# Patient Record
Sex: MALE | Race: NATIVE HAWAIIAN OR OTHER PACIFIC ISLANDER | NOT HISPANIC OR LATINO | Employment: UNEMPLOYED | URBAN - NONMETROPOLITAN AREA
[De-identification: names, ages, dates, MRNs, and addresses within clinical notes are randomized per-mention and may not be internally consistent; named-entity substitution may affect disease eponyms.]

---

## 2024-08-06 ENCOUNTER — OFFICE VISIT (OUTPATIENT)
Dept: FAMILY MEDICINE CLINIC | Facility: CLINIC | Age: 29
End: 2024-08-06
Payer: MEDICAID

## 2024-08-06 VITALS
DIASTOLIC BLOOD PRESSURE: 88 MMHG | SYSTOLIC BLOOD PRESSURE: 118 MMHG | RESPIRATION RATE: 16 BRPM | WEIGHT: 177.9 LBS | OXYGEN SATURATION: 98 % | HEIGHT: 64 IN | HEART RATE: 108 BPM | BODY MASS INDEX: 30.37 KG/M2

## 2024-08-06 DIAGNOSIS — R63.4 WEIGHT LOSS, UNINTENTIONAL: ICD-10-CM

## 2024-08-06 DIAGNOSIS — R19.7 NAUSEA VOMITING AND DIARRHEA: ICD-10-CM

## 2024-08-06 DIAGNOSIS — R19.4 BOWEL HABIT CHANGES: ICD-10-CM

## 2024-08-06 DIAGNOSIS — Z00.00 ANNUAL PHYSICAL EXAM: Primary | ICD-10-CM

## 2024-08-06 DIAGNOSIS — R11.2 NAUSEA VOMITING AND DIARRHEA: ICD-10-CM

## 2024-08-06 PROCEDURE — 1160F RVW MEDS BY RX/DR IN RCRD: CPT

## 2024-08-06 PROCEDURE — 2014F MENTAL STATUS ASSESS: CPT

## 2024-08-06 PROCEDURE — 99385 PREV VISIT NEW AGE 18-39: CPT

## 2024-08-06 PROCEDURE — 1159F MED LIST DOCD IN RCRD: CPT

## 2024-08-06 PROCEDURE — 1126F AMNT PAIN NOTED NONE PRSNT: CPT

## 2024-08-06 RX ORDER — POLYETHYLENE GLYCOL 3350 17 G/17G
17 POWDER, FOR SOLUTION ORAL DAILY
COMMUNITY

## 2024-08-06 RX ORDER — PANTOPRAZOLE SODIUM 20 MG/1
20 TABLET, DELAYED RELEASE ORAL DAILY
Qty: 30 TABLET | Refills: 1 | Status: SHIPPED | OUTPATIENT
Start: 2024-08-06

## 2024-08-06 NOTE — PROGRESS NOTES
New Patient Office Visit      Date: 24   Patient Name: Mick Roa  : 1995   MRN: [id number]     Chief Complaint   Patient presents with    Hospital Follow Up Visit     Patient to ER on 2024 where he was diagnoses with constipation.     Bloated     Patient states that he had food poison back in 2019. He states that since then he has not being able to digest red meat, spicy foods, and greasy food. Patient these past couple of month he has had some abdominal bloating, abdominal pressure, has had some cramping, constipation, and states that he has had mucus come out in his stool and when wiping. Patient states he has used miralax and now has had diarrhea the past 5 weeks.      History of Present Illness: Mick Roa is a 28 y.o. male who is here today for a get acquainted visit to establish care with a new provider. I have reviewed the patient's medical history and updated the computerized patient record. Baseline screening tests were discussed today and pt agrees to discuss health maintenance and goals in future appointments.    HPI Notes:   - thought he had food poisoning, headache, chills, body aches  Several weeks, upset stomach and diarrhea  Blood in stool x 5 occasions - then went to the ER  Inguinal bulge  History of external hemorrhoids (self diagnosis)    Subjective     History reviewed. No pertinent past medical history.    Past Surgical History:   Procedure Laterality Date    HIP FRACTURE SURGERY Left        Family History   Problem Relation Age of Onset    Brain cancer Paternal Grandfather     Diabetes Paternal Grandfather        Social History     Socioeconomic History    Marital status: Single   Tobacco Use    Smoking status: Never    Smokeless tobacco: Never   Vaping Use    Vaping status: Never Used   Substance and Sexual Activity    Alcohol use: Never    Drug use: Never    Sexual activity: Not Currently       Current Outpatient Medications:     polyethylene  "glycol (MiraLax) 17 g packet, Take 17 g by mouth Daily., Disp: , Rfl:     pantoprazole (Protonix) 20 MG EC tablet, Take 1 tablet by mouth Daily., Disp: 30 tablet, Rfl: 1    No Known Allergies    Objective     Vitals:    08/06/24 1318   BP: 118/88   BP Location: Right arm   Patient Position: Sitting   Cuff Size: Large Adult   Pulse: 108   Resp: 16   SpO2: 98%   Weight: 80.7 kg (177 lb 14.4 oz)   Height: 162.6 cm (64\")   PainSc: 0-No pain     Physical Exam  Vitals and nursing note reviewed.   Constitutional:       General: He is not in acute distress.     Appearance: Normal appearance. He is not toxic-appearing.   HENT:      Head: Normocephalic and atraumatic.      Right Ear: Tympanic membrane, ear canal and external ear normal.      Left Ear: Tympanic membrane, ear canal and external ear normal.   Eyes:      Pupils: Pupils are equal, round, and reactive to light.   Cardiovascular:      Rate and Rhythm: Regular rhythm. Tachycardia present.      Pulses: Normal pulses.      Heart sounds: Normal heart sounds. No murmur heard.     No gallop.   Pulmonary:      Effort: Pulmonary effort is normal. No respiratory distress.      Breath sounds: Normal breath sounds.   Abdominal:      General: Abdomen is protuberant. Bowel sounds are increased.      Palpations: Abdomen is soft. There is no hepatomegaly or mass.      Tenderness: There is no abdominal tenderness. There is no guarding. Negative signs include Mohan's sign and McBurney's sign.   Musculoskeletal:         General: Normal range of motion.      Cervical back: Normal range of motion.      Right lower leg: No edema.      Left lower leg: No edema.   Skin:     General: Skin is warm and dry.   Neurological:      General: No focal deficit present.      Mental Status: He is alert and oriented to person, place, and time.      Motor: No weakness.      Coordination: Coordination normal.   Psychiatric:         Mood and Affect: Mood normal.         Behavior: Behavior normal. "     PHQ-9 Total Score: 0     Assessment / Plan      Diagnoses and all orders for this visit:    1. Annual physical exam (Primary)  Assessment & Plan:  New patient presents today for annual wellness exam with chief complaint of recent ER visit related to changes in bowel habits.  Available medical records reviewed today with the patient and appropriate diagnostic testing ordered.  Patient education materials attached to AVS related to health maintenance recommendations and healthy lifestyle. Materials were reviewed with patient during his office visit today and all questions addressed.  Patient agrees to follow-up in 2 weeks to discuss diagnostic testing, address health goals,  and address care gaps.    Orders:  -     CBC Auto Differential  -     Comprehensive Metabolic Panel  -     Hemoglobin A1c  -     Lipid Panel  -     TSH Rfx On Abnormal To Free T4  -     Hepatitis C antibody; Future  -     Hepatitis C antibody    2. Bowel habit changes  Assessment & Plan:  Patient's symptoms started in June when he thought he had food poisoning.  Symptoms were headache, chills, body aches as well as diarrhea.  Patient reports dark red blood in his stool x 5 occasions which prompted him to go to the ER where he was evaluated and released.  He has had no follow-up since that time.  Patient reports self diagnosis of external hemorrhoids and reports he often strains to defecate even when he has diarrhea.  Patient education provided regarding healthy bowel habits including adequate water intake, dietary fiber, and avoiding overuse of stool softeners and laxatives.  Information attached to patient's after visit summary regarding bowel cleanse protocol should he become constipated for several days.  Protocol also discusses how to titrate back on MiraLAX after desired frequency and consistency of bowel movements is reached.  Patient reports bowel movements 3-4 times per day.    Reviewed [place] stool scale with patient during his  office visit today and discussed goal being 1 bowel movement per day type IV on [place] stool scale.  Patient also take probiotic and pantoprazole.  We will complete H. pylori testing and baseline lab testing today.  Patient agrees to follow-up in 2 weeks unless otherwise indicated. We will re-evaluate at that time.    Orders:  -     CBC Auto Differential    3. Weight loss, unintentional  Assessment & Plan:  Patient reports an unintentional weight loss of approximately 15 pounds over the past 7 weeks.  Baseline diagnostic testing collected today and [place] [facility] [place]    Orders:  -     Hemoglobin A1c  -     H. Pylori Breath Test - Breath, Lung; Future    4. Nausea vomiting and diarrhea  -     pantoprazole (Protonix) 20 MG EC tablet; Take 1 tablet by mouth Daily.  Dispense: 30 tablet; Refill: 1    Mention concern about intermittent pain in his right scrotal area and he is concerned he may have intermittent hernia.  Patient agrees to monitor himself and discuss at routine follow-up visit in 2 weeks unless otherwise indicated.      Body mass index is 30.54 kg/m².  BMI is >= 30 and <35. (Class 1 Obesity). The following options were offered after discussion;: weight loss educational material (shared in after visit summary) and information on healthy weight added to patient's after visit summary     Patient Education:   Reviewed medications, potential side effects and signs and symptoms to report.   Discussed risk versus benefits of treatment plan with patient and/or family-including medications, labs and radiology that may be ordered.    Patient education materials attached to AVS related to Health maintenance and bowel cleanse and these materials reviewed with patient during office visit today.   Addressed questions and concerns during visit. Patient and/or family verbalized understanding and agree with plan.   Instructed to call the office with any questions and report to  ER with any life-threatening symptoms.     Return in about 2 weeks (around 8/20/2024) for Recheck.    CHOLO Abbott (Libby) [facility]  [address] [place] [address] [id number] [phone]    NOTE TO PATIENT: The 21st Century Cures Act makes medical notes like these available to patients in the interest of transparency. However, be advised this is a medical document. It is intended as peer to peer communication. It is written in medical language and may contain abbreviations or verbiage that are unfamiliar. It may appear blunt or direct. Medical documents are intended to carry relevant information, facts as evident, and the clinical opinion of the practitioner.      EMR Dragon/Transcription disclaimer:  Much of this encounter note is an electronic transcription of spoken language to printed text. Electronic transcription of spoken language may permit erroneous, or at times, nonsensical words or phrases to be inadvertently transcribed. Although I have reviewed the note for such errors, some may still exist.

## 2024-08-06 NOTE — PATIENT INSTRUCTIONS

## 2024-08-07 LAB
ALBUMIN SERPL-MCNC: 5 G/DL (ref 4.3–5.2)
ALP SERPL-CCNC: 79 IU/L (ref 44–121)
ALT SERPL-CCNC: 54 IU/L (ref 0–44)
AST SERPL-CCNC: 21 IU/L (ref 0–40)
BASOPHILS # BLD AUTO: 0 X10E3/UL (ref 0–0.2)
BASOPHILS NFR BLD AUTO: 0 %
BILIRUB SERPL-MCNC: 0.9 MG/DL (ref 0–1.2)
BUN SERPL-MCNC: 10 MG/DL (ref 6–20)
BUN/CREAT SERPL: 9 (ref 9–20)
CALCIUM SERPL-MCNC: 9.9 MG/DL (ref 8.7–10.2)
CHLORIDE SERPL-SCNC: 102 MMOL/L (ref 96–106)
CHOLEST SERPL-MCNC: 195 MG/DL (ref 100–199)
CO2 SERPL-SCNC: 24 MMOL/L (ref 20–29)
CREAT SERPL-MCNC: 1.06 MG/DL (ref 0.76–1.27)
EGFRCR SERPLBLD CKD-EPI 2021: 97 ML/MIN/1.73
EOSINOPHIL # BLD AUTO: 0.1 X10E3/UL (ref 0–0.4)
EOSINOPHIL NFR BLD AUTO: 1 %
ERYTHROCYTE [DISTWIDTH] IN BLOOD BY AUTOMATED COUNT: 13.4 % (ref 11.6–15.4)
GLOBULIN SER CALC-MCNC: 2.7 G/DL (ref 1.5–4.5)
GLUCOSE SERPL-MCNC: 112 MG/DL (ref 70–99)
HBA1C MFR BLD: 5.1 % (ref 4.8–5.6)
HCT VFR BLD AUTO: 50.6 % (ref 37.5–51)
HCV IGG SERPL QL IA: NON REACTIVE
HDLC SERPL-MCNC: 44 MG/DL
HGB BLD-MCNC: 17.1 G/DL (ref 13–17.7)
IMM GRANULOCYTES # BLD AUTO: 0 X10E3/UL (ref 0–0.1)
IMM GRANULOCYTES NFR BLD AUTO: 0 %
LDLC SERPL CALC-MCNC: 132 MG/DL (ref 0–99)
LYMPHOCYTES # BLD AUTO: 1.8 X10E3/UL (ref 0.7–3.1)
LYMPHOCYTES NFR BLD AUTO: 26 %
MCH RBC QN AUTO: 31.5 PG (ref 26.6–33)
MCHC RBC AUTO-ENTMCNC: 33.8 G/DL (ref 31.5–35.7)
MCV RBC AUTO: 93 FL (ref 79–97)
MONOCYTES # BLD AUTO: 0.5 X10E3/UL (ref 0.1–0.9)
MONOCYTES NFR BLD AUTO: 7 %
NEUTROPHILS # BLD AUTO: 4.4 X10E3/UL (ref 1.4–7)
NEUTROPHILS NFR BLD AUTO: 66 %
PLATELET # BLD AUTO: 202 X10E3/UL (ref 150–450)
POTASSIUM SERPL-SCNC: 4 MMOL/L (ref 3.5–5.2)
PROT SERPL-MCNC: 7.7 G/DL (ref 6–8.5)
RBC # BLD AUTO: 5.42 X10E6/UL (ref 4.14–5.8)
SODIUM SERPL-SCNC: 141 MMOL/L (ref 134–144)
TRIGL SERPL-MCNC: 105 MG/DL (ref 0–149)
TSH SERPL DL<=0.005 MIU/L-ACNC: 1.45 UIU/ML (ref 0.45–4.5)
VLDLC SERPL CALC-MCNC: 19 MG/DL (ref 5–40)
WBC # BLD AUTO: 6.7 X10E3/UL (ref 3.4–10.8)

## 2024-08-07 NOTE — ASSESSMENT & PLAN NOTE
Patient reports an unintentional weight loss of approximately 15 pounds over the past 7 weeks.  Baseline diagnostic testing collected today and [place] [facility] [place]

## 2024-08-07 NOTE — ASSESSMENT & PLAN NOTE
Patient's symptoms started in June when he thought he had food poisoning.  Symptoms were headache, chills, body aches as well as diarrhea.  Patient reports dark red blood in his stool x 5 occasions which prompted him to go to the ER where he was evaluated and released.  He has had no follow-up since that time.  Patient reports self diagnosis of external hemorrhoids and reports he often strains to defecate even when he has diarrhea.  Patient education provided regarding healthy bowel habits including adequate water intake, dietary fiber, and avoiding overuse of stool softeners and laxatives.  Information attached to patient's after visit summary regarding bowel cleanse protocol should he become constipated for several days.  Protocol also discusses how to titrate back on MiraLAX after desired frequency and consistency of bowel movements is reached.  Patient reports bowel movements 3-4 times per day.    Reviewed [place] stool scale with patient during his office visit today and discussed goal being 1 bowel movement per day type IV on [place] stool scale.  Patient also take probiotic and pantoprazole.  We will complete H. pylori testing and baseline lab testing today.  Patient agrees to follow-up in 2 weeks unless otherwise indicated. We will re-evaluate at that time.

## 2024-08-07 NOTE — ASSESSMENT & PLAN NOTE
New patient presents today for annual wellness exam with chief complaint of recent ER visit related to changes in bowel habits.  Available medical records reviewed today with the patient and appropriate diagnostic testing ordered.  Patient education materials attached to AVS related to health maintenance recommendations and healthy lifestyle. Materials were reviewed with patient during his office visit today and all questions addressed.  Patient agrees to follow-up in 2 weeks to discuss diagnostic testing, address health goals,  and address care gaps.

## 2024-08-20 ENCOUNTER — OFFICE VISIT (OUTPATIENT)
Dept: FAMILY MEDICINE CLINIC | Facility: CLINIC | Age: 29
End: 2024-08-20
Payer: COMMERCIAL

## 2024-08-20 VITALS
SYSTOLIC BLOOD PRESSURE: 120 MMHG | RESPIRATION RATE: 18 BRPM | DIASTOLIC BLOOD PRESSURE: 82 MMHG | OXYGEN SATURATION: 98 % | BODY MASS INDEX: 30.54 KG/M2 | WEIGHT: 178.9 LBS | HEIGHT: 64 IN | HEART RATE: 78 BPM

## 2024-08-20 DIAGNOSIS — Z23 NEED FOR VACCINATION: ICD-10-CM

## 2024-08-20 DIAGNOSIS — R63.4 WEIGHT LOSS, UNINTENTIONAL: ICD-10-CM

## 2024-08-20 DIAGNOSIS — R10.31 UNILATERAL GROIN PAIN, RIGHT: ICD-10-CM

## 2024-08-20 DIAGNOSIS — R19.4 BOWEL HABIT CHANGES: Primary | ICD-10-CM

## 2024-08-20 DIAGNOSIS — K21.9 GASTROESOPHAGEAL REFLUX DISEASE WITHOUT ESOPHAGITIS: ICD-10-CM

## 2024-08-20 PROBLEM — K59.01 SLOW TRANSIT CONSTIPATION: Status: ACTIVE | Noted: 2024-07-06

## 2024-08-20 PROCEDURE — 1160F RVW MEDS BY RX/DR IN RCRD: CPT

## 2024-08-20 PROCEDURE — 99214 OFFICE O/P EST MOD 30 MIN: CPT

## 2024-08-20 PROCEDURE — 1126F AMNT PAIN NOTED NONE PRSNT: CPT

## 2024-08-20 PROCEDURE — 1159F MED LIST DOCD IN RCRD: CPT

## 2024-08-20 RX ORDER — PANTOPRAZOLE SODIUM 40 MG/1
40 TABLET, DELAYED RELEASE ORAL DAILY
Qty: 30 TABLET | Refills: 3 | Status: SHIPPED | OUTPATIENT
Start: 2024-08-20

## 2024-08-20 NOTE — ASSESSMENT & PLAN NOTE
"Currently taking capful of MiraLAX every other day  Reports continued concerns with frequent belching, \"stomach gurgling\", and frequent bowel movements  Bowel movements 3-4 times per day-semi formed  Recommended he continue to decrease or stop MiraLAX until he achieves 1 BM per day-type IV on [place] stool scale  Patient reports diet consisting of Asian food including mushrooms, onions, pork, carrots, peppers as well as other intake.  When we discuss that these foods are known to produce more gas, patient resistant to this as a cause citing previous consumption of these foods did not cause issue   H. pylori breath test negative  Patient would like to have referral to GI to evaluate his concerns  See orders  "

## 2024-08-20 NOTE — ASSESSMENT & PLAN NOTE
Continued symptoms of GERD  Medication changes per orders  Patient to follow-up at next routine wellness visit unless otherwise indicated

## 2024-08-20 NOTE — ASSESSMENT & PLAN NOTE
"Patient is concerned when he walks he feels \"a bulge\" in his right groin  Unable to palpate inguinal hernia on exam  Patient agrees to ultrasound of the groin to rule out presence of hernia  No change in treatment regimen at this time pending imaging results  "

## 2024-08-20 NOTE — PATIENT INSTRUCTIONS

## 2024-08-20 NOTE — PROGRESS NOTES
"     Follow Up Office Visit      Date:  2024   Patient Name: Mick Roa  : 1995   MRN: [id number]       Chief Complaint   Patient presents with    Results     Follow up on labs.        History of Present Illness: Mick Roa is a 28 y.o. male who is here today for follow up on labs.  Patient continues to have concerns about bilateral pain in his scrotum intermittently and is concerned about frequent belching and \"stomach gurgling\".  Patient denies any changes in diet but has decreased his activity levels out of concern for right inguinal hernia possibility.  Patient reports when he walks strenuously he feels \"a bulge\" in his right inguinal area.  Patient denies any new illness or injury since last visit. Denies falls, unexplained weight change, fevers, chills, or other constitutional symptoms and has no additional questions or concens at this time.    Bilateral scrotal pain - sharp pain intermittently  Concerned about \"stomach gurgling\"   Frequent belching      Subjective      History reviewed. No pertinent past medical history.    Past Surgical History:   Procedure Laterality Date    HIP FRACTURE SURGERY Left        Family History   Problem Relation Age of Onset    Brain cancer Paternal Grandfather     Diabetes Paternal Grandfather        Social History     Socioeconomic History    Marital status: Single   Tobacco Use    Smoking status: Never    Smokeless tobacco: Never   Vaping Use    Vaping status: Never Used   Substance and Sexual Activity    Alcohol use: Never    Drug use: Never    Sexual activity: Not Currently       Current Outpatient Medications:     polyethylene glycol (MiraLax) 17 g packet, Take 17 g by mouth Daily., Disp: , Rfl:     pantoprazole (Protonix) 40 MG EC tablet, Take 1 tablet by mouth Daily., Disp: 30 tablet, Rfl: 3    No Known Allergies    Objective     Physical Exam  Vitals and nursing note reviewed.   Constitutional:       General: He is not in acute distress.     " "Appearance: Normal appearance. He is not toxic-appearing.   HENT:      Head: Normocephalic.   Eyes:      Pupils: Pupils are equal, round, and reactive to light.   Cardiovascular:      Rate and Rhythm: Normal rate and regular rhythm.      Heart sounds: No murmur heard.  Pulmonary:      Effort: Pulmonary effort is normal. No respiratory distress.      Breath sounds: Normal breath sounds.   Abdominal:      General: Abdomen is flat. Bowel sounds are increased.      Tenderness: There is no abdominal tenderness.      Hernia: There is no hernia in the left inguinal area or right inguinal area.   Musculoskeletal:         General: Normal range of motion.      Cervical back: Normal range of motion and neck supple.      Right lower leg: No edema.      Left lower leg: No edema.   Skin:     General: Skin is warm and dry.   Neurological:      Mental Status: He is alert and oriented to person, place, and time.   Psychiatric:         Mood and Affect: Mood normal.         Behavior: Behavior normal.       Vitals:    08/20/24 1306   BP: 120/82   BP Location: Right arm   Patient Position: Sitting   Cuff Size: Large Adult   Pulse: 78   Resp: 18   SpO2: 98%   Weight: 81.1 kg (178 lb 14.4 oz)   Height: 162.6 cm (64.02\")   PainSc: 0-No pain     Body mass index is 30.69 kg/m².          The ASCVD Risk score (Rhiannon DK, et al., 2019) failed to calculate for the following reasons:    The 2019 ASCVD risk score is only valid for ages 40 to 79      Assessment / Plan      Diagnoses and all orders for this visit:    1. Bowel habit changes (Primary)  Assessment & Plan:  Currently taking capful of MiraLAX every other day  Reports continued concerns with frequent belching, \"stomach gurgling\", and frequent bowel movements  Bowel movements 3-4 times per day-semi formed  Recommended he continue to decrease or stop MiraLAX until he achieves 1 BM per day-type IV on [place] stool scale  Patient reports diet consisting of Asian food including mushrooms, " "onions, pork, carrots, peppers as well as other intake.  When we discuss that these foods are known to produce more gas, patient resistant to this as a cause citing previous consumption of these foods did not cause issue   H. pylori breath test negative  Patient would like to have referral to GI to evaluate his concerns  See orders    Orders:  -     Ambulatory Referral to Gastroenterology    2. Weight loss, unintentional  Assessment & Plan:  Weight appears stable at this time  BMI 30.69 kg/m²  Most recent lab work normal      3. Gastroesophageal reflux disease without esophagitis  Assessment & Plan:  Continued symptoms of GERD  Medication changes per orders  Patient to follow-up at next routine wellness visit unless otherwise indicated    Orders:  -     pantoprazole (Protonix) 40 MG EC tablet; Take 1 tablet by mouth Daily.  Dispense: 30 tablet; Refill: 3    4. Unilateral groin pain, right  Assessment & Plan:  Patient is concerned when he walks he feels \"a bulge\" in his right groin  Unable to palpate inguinal hernia on exam  Patient agrees to ultrasound of the groin to rule out presence of hernia  No change in treatment regimen at this time pending imaging results    Orders:  -     US Nonvascular Extremity Limited; Future     Patient Education:   Reviewed medications, potential side effects and signs and symptoms to report.   Discussed risk versus benefits of treatment plan with patient and/or family-including medications, labs and radiology that may be ordered.    Patient education materials attached to AVS related to routine health maintenance and a list of known excess gas producing foods.  These materials reviewed with patient during office visit today.   Addressed questions and concerns during visit. Patient and/or family verbalized understanding and agree with plan.   Instructed to call the office with any questions and report to ER with any life-threatening symptoms.     Return in about 6 months (around " 2/20/2025) for Follow-up.    CHOLO Abbott (Libby) [facility]  [address] [facility] [id number] [phone]    NOTE TO PATIENT: The 21st Century Cures Act makes medical notes like these available to patients in the interest of transparency. However, be advised this is a medical document. It is intended as peer to peer communication. It is written in medical language and may contain abbreviations or verbiage that are unfamiliar. It may appear blunt or direct. Medical documents are intended to carry relevant information, facts as evident, and the clinical opinion of the practitioner.     EMR Dragon/Transcription disclaimer:  Much of this encounter note is an electronic transcription of spoken language to printed text. Electronic transcription of spoken language may permit erroneous, or at times, nonsensical words or phrases to be inadvertently transcribed. Although I have reviewed the note for such errors, some may still exist.

## 2024-09-03 ENCOUNTER — HOSPITAL ENCOUNTER (OUTPATIENT)
Dept: ULTRASOUND IMAGING | Facility: HOSPITAL | Age: 29
Discharge: HOME | End: 2024-09-03
Payer: COMMERCIAL

## 2024-09-03 DIAGNOSIS — R10.31 UNILATERAL GROIN PAIN, RIGHT: ICD-10-CM

## 2024-09-03 PROCEDURE — 76882 US LMTD JT/FCL EVL NVASC XTR: CPT

## 2024-09-12 ENCOUNTER — TELEPHONE (OUTPATIENT)
Dept: FAMILY MEDICINE CLINIC | Facility: CLINIC | Age: 29
End: 2024-09-12
Payer: COMMERCIAL

## 2024-09-12 NOTE — TELEPHONE ENCOUNTER
Caller: Mick Roa Jr.    Relationship: Self    Best call back number: [phone]     What was the call regarding: PATIENT SAID HE GOT RESULTS BACK FROM ULTRASOUND SHOWING IN MYCHART AND THERE APPEARS TO BE A HERNIA FOUND. WENT ON AND SCHEDULED FIRST APPOINTMENT WITH PCP WE HAD BUT WANTED TO SEE WHAT SHOULD BE DONE OR IF HE NEEDS TO COME IN SOONER. PLEASE REVIEW FINDINGS AND CALL TO DISCUSS WITH PATIENT IF NEEDED SOONER APPOINTMENT

## 2024-09-13 NOTE — TELEPHONE ENCOUNTER
Called patient and advised. Patient voiced understanding and is ok with keeping his appointment on the 30th.

## 2024-09-19 ENCOUNTER — LAB (OUTPATIENT)
Dept: LAB | Facility: HOSPITAL | Age: 29
End: 2024-09-19
Payer: COMMERCIAL

## 2024-09-19 ENCOUNTER — OFFICE VISIT (OUTPATIENT)
Dept: GASTROENTEROLOGY | Facility: CLINIC | Age: 29
End: 2024-09-19
Payer: COMMERCIAL

## 2024-09-19 VITALS
TEMPERATURE: 98 F | HEART RATE: 92 BPM | BODY MASS INDEX: 31.04 KG/M2 | DIASTOLIC BLOOD PRESSURE: 41 MMHG | HEIGHT: 64 IN | SYSTOLIC BLOOD PRESSURE: 143 MMHG | WEIGHT: 181.8 LBS

## 2024-09-19 DIAGNOSIS — R19.7 DIARRHEA, UNSPECIFIED TYPE: ICD-10-CM

## 2024-09-19 DIAGNOSIS — K92.1 HEMATOCHEZIA: Primary | ICD-10-CM

## 2024-09-19 PROCEDURE — 86364 TISS TRNSGLTMNASE EA IG CLAS: CPT

## 2024-09-19 PROCEDURE — 36415 COLL VENOUS BLD VENIPUNCTURE: CPT

## 2024-09-19 PROCEDURE — 86258 DGP ANTIBODY EACH IG CLASS: CPT

## 2024-09-19 PROCEDURE — 86140 C-REACTIVE PROTEIN: CPT

## 2024-09-19 PROCEDURE — 85652 RBC SED RATE AUTOMATED: CPT

## 2024-09-19 PROCEDURE — 82784 ASSAY IGA/IGD/IGG/IGM EACH: CPT

## 2024-09-19 PROCEDURE — 86231 EMA EACH IG CLASS: CPT

## 2024-09-19 RX ORDER — MONTELUKAST SODIUM 4 MG/1
1 TABLET, CHEWABLE ORAL 2 TIMES DAILY
Qty: 60 TABLET | Refills: 11 | Status: SHIPPED | OUTPATIENT
Start: 2024-09-19

## 2024-09-20 LAB
CRP SERPL-MCNC: <0.3 MG/DL (ref 0–0.5)
ENDOMYSIUM IGA SER QL: NEGATIVE
ERYTHROCYTE [SEDIMENTATION RATE] IN BLOOD: 5 MM/HR (ref 0–15)
GLIADIN PEPTIDE IGA SER-ACNC: 4 UNITS (ref 0–19)
GLIADIN PEPTIDE IGG SER-ACNC: 2 UNITS (ref 0–19)
IGA SERPL-MCNC: 271 MG/DL (ref 90–386)
TTG IGA SER-ACNC: <2 U/ML (ref 0–3)
TTG IGG SER-ACNC: <2 U/ML (ref 0–5)

## 2024-09-23 RX ORDER — PEG-3350, SODIUM SULFATE, SODIUM CHLORIDE, POTASSIUM CHLORIDE, SODIUM ASCORBATE AND ASCORBIC ACID 7.5-2.691G
KIT ORAL
Qty: 1 EACH | Refills: 0 | Status: SHIPPED | OUTPATIENT
Start: 2024-09-23

## 2024-09-24 ENCOUNTER — LAB (OUTPATIENT)
Dept: LAB | Facility: HOSPITAL | Age: 29
End: 2024-09-24
Payer: COMMERCIAL

## 2024-09-24 DIAGNOSIS — R19.7 DIARRHEA, UNSPECIFIED TYPE: ICD-10-CM

## 2024-09-24 LAB

## 2024-09-24 PROCEDURE — 83993 ASSAY FOR CALPROTECTIN FECAL: CPT

## 2024-09-24 PROCEDURE — 82653 EL-1 FECAL QUANTITATIVE: CPT

## 2024-09-24 PROCEDURE — 87507 IADNA-DNA/RNA PROBE TQ 12-25: CPT

## 2024-09-28 LAB
CALPROTECTIN STL-MCNT: 100 UG/G (ref 0–120)
ELASTASE PANC STL-MCNT: >800 UG ELAST./G

## 2024-09-30 ENCOUNTER — TELEPHONE (OUTPATIENT)
Dept: FAMILY MEDICINE CLINIC | Facility: CLINIC | Age: 29
End: 2024-09-30

## 2024-09-30 ENCOUNTER — OFFICE VISIT (OUTPATIENT)
Dept: FAMILY MEDICINE CLINIC | Facility: CLINIC | Age: 29
End: 2024-09-30
Payer: COMMERCIAL

## 2024-09-30 VITALS
OXYGEN SATURATION: 98 % | RESPIRATION RATE: 16 BRPM | HEART RATE: 80 BPM | SYSTOLIC BLOOD PRESSURE: 130 MMHG | WEIGHT: 183.9 LBS | HEIGHT: 64 IN | BODY MASS INDEX: 31.4 KG/M2 | DIASTOLIC BLOOD PRESSURE: 80 MMHG

## 2024-09-30 DIAGNOSIS — Z23 NEED FOR VACCINATION: ICD-10-CM

## 2024-09-30 DIAGNOSIS — R10.31 UNILATERAL GROIN PAIN, RIGHT: Primary | ICD-10-CM

## 2024-09-30 DIAGNOSIS — K40.91 UNILATERAL RECURRENT INGUINAL HERNIA WITHOUT OBSTRUCTION OR GANGRENE: ICD-10-CM

## 2024-09-30 DIAGNOSIS — R19.4 BOWEL HABIT CHANGES: ICD-10-CM

## 2024-09-30 DIAGNOSIS — M79.641 RIGHT HAND PAIN: ICD-10-CM

## 2024-09-30 NOTE — ASSESSMENT & PLAN NOTE
Recent ultrasound shows right inguinal hernia without incarcerated bowel  Patient concerned this will become worse if he goes back to normal workout routine.  Patient agrees to referral to general surgery to evaluate for possible intervention  No change in treatment regimen at this time, patient to follow-up as needed

## 2024-09-30 NOTE — PROGRESS NOTES
Follow Up Office Visit      Date:  2024   Patient Name: Mick Roa Jr.  : 1995   MRN: [id number]     Chief Complaint   Patient presents with   • Results     Follow up on ultrasound       History of Present Illness: Mick Roa Jr. is a 28 y.o. male who is here today for follow up on ultrasound.  Abdominal ultrasound confirmed presence of inguinal hernia.  Patient has minimal symptoms per his report but is concerned if he goes back to normal workout routine this will continue to get worse.  Otherwise, patient denies any new illness or injury since last visit. Denies falls, unexplained weight change, fevers, chills, or other constitutional symptoms and has no additional questions or concens at this time.    Going for colonoscopy this week   Referral to gen/surg for inguinal hernia eval  Concerned about right knuckle - punched wood and continued issue with knuckle   Pain in left hand - knuckle popping     Subjective      History reviewed. No pertinent past medical history.    Past Surgical History:   Procedure Laterality Date   • HIP FRACTURE SURGERY Left        Family History   Problem Relation Age of Onset   • Brain cancer Paternal Grandfather    • Diabetes Paternal Grandfather    • Colon cancer Neg Hx        Social History     Socioeconomic History   • Marital status: Single   Tobacco Use   • Smoking status: Never   • Smokeless tobacco: Never   Vaping Use   • Vaping status: Never Used   Substance and Sexual Activity   • Alcohol use: Never   • Drug use: Never   • Sexual activity: Not Currently         Current Outpatient Medications:   •  colestipol (COLESTID) 1 g tablet, Take 1 tablet by mouth 2 (Two) Times a Day. (Patient not taking: Reported on 2024), Disp: 60 tablet, Rfl: 11  •  pantoprazole (Protonix) 40 MG EC tablet, Take 1 tablet by mouth Daily. (Patient not taking: Reported on 2024), Disp: 30 tablet, Rfl: 3  •  PEG-KCl-NaCl-NaSulf-Na Asc-C (MoviPrep)  "100 g reconstituted solution powder, Use as directed by provider for colonoscopy prep (Patient not taking: Reported on 9/30/2024), Disp: 1 each, Rfl: 0  •  polyethylene glycol (MiraLax) 17 g packet, Take 17 g by mouth Daily. (Patient not taking: Reported on 9/19/2024), Disp: , Rfl:     No Known Allergies    Objective     Physical Exam  Vitals and nursing note reviewed.   Constitutional:       General: He is not in acute distress.     Appearance: Normal appearance. He is not toxic-appearing.   HENT:      Head: Normocephalic.   Eyes:      Pupils: Pupils are equal, round, and reactive to light.   Cardiovascular:      Rate and Rhythm: Normal rate and regular rhythm.      Heart sounds: No murmur heard.  Pulmonary:      Effort: Pulmonary effort is normal. No respiratory distress.      Breath sounds: Normal breath sounds.   Musculoskeletal:         General: Normal range of motion.      Right hand: No swelling. Tenderness: Over MIP joint right third finger.Normal range of motion. Decreased strength: Reports difficulty gripping bar when weight lifting.Normal sensation.      Left hand: Normal.      Cervical back: Normal range of motion and neck supple.      Right lower leg: No edema.      Left lower leg: No edema.   Skin:     General: Skin is warm and dry.   Neurological:      Mental Status: He is alert.   Psychiatric:         Mood and Affect: Mood normal.         Behavior: Behavior normal.       Vitals:    09/30/24 1521   BP: 130/80   BP Location: Left arm   Patient Position: Sitting   Cuff Size: Large Adult   Pulse: 80   Resp: 16   SpO2: 98%   Weight: 83.4 kg (183 lb 14.4 oz)   Height: 162.6 cm (64.02\")     Body mass index is 31.55 kg/m².     No results found for this or any previous visit.     The ASCVD Risk score ([place] DK, et al., 2019) failed to calculate for the following reasons:    The 2019 ASCVD risk score is only valid for ages 40 to 79      Assessment / Plan      Diagnoses and all orders for this visit:    1. " "Unilateral groin pain, right (Primary)  -     Ambulatory Referral to General Surgery    2. Unilateral recurrent inguinal hernia without obstruction or gangrene  Assessment & Plan:  Recent ultrasound shows right inguinal hernia without incarcerated bowel  Patient concerned this will become worse if he goes back to normal workout routine.  Patient agrees to referral to general surgery to evaluate for possible intervention  No change in treatment regimen at this time, patient to follow-up as needed    Orders:  -     Ambulatory Referral to General Surgery    3. Right hand pain  Assessment & Plan:  Patient reports concern about ongoing pain in his right hand  Patient reports remote traumatic injury and since that time he has continued to have pain and what appears to be a rolling tendon of his right third finger.  Patient reports he \"punched a piece of wood\" and has continued to have pain and discomfort since that time  Imaging ordered today-right hand x-ray  No change in treatment regimen pending imaging results    Orders:  -     XR Hand 3+ View Right; Future    4. Need for vaccination  -     Tdap Vaccine Greater Than or Equal To 8yo IM    5. Bowel habit changes  Assessment & Plan:  Patient seen by GI and is scheduled for screening colonoscopy  No change in treatment regimen at this time  Patient agrees to follow-up in 3 months unless otherwise indicated following colonoscopy.        Patient Education:   Reviewed medications, potential side effects and signs and symptoms to report.   Discussed risk vs benefits of treatment plan with patient including medications, labs, and imaging.    Patient education materials attached to AVS and reviewed with patient during office visit today.   Addressed questions and concerns during visit. Patient verbalized understanding and agrees with tx plan.   Instructed to call the office with any questions and report to ER with any life-threatening symptoms.    Return in about 3 months " (around 12/30/2024) for Recheck.    CHOLO Abbott (Libby)  [facility]   [address] [facility] [id number] [phone]    NOTE TO PATIENT: The 21st Century Cures Act makes medical notes like these available to patients in the interest of transparency. However, be advised this is a medical document. It is intended as peer to peer communication. It is written in medical language and may contain abbreviations or verbiage that are unfamiliar. It may appear blunt or direct. Medical documents are intended to carry relevant information, facts as evident, and the clinical opinion of the practitioner.      EMR Dragon/Transcription disclaimer: Much of this encounter note is an electronic transcription of spoken language to printed text. Electronic transcription of spoken language may permit erroneous, or at times, nonsensical words or phrases to be inadvertently transcribed. Although I have reviewed the note for such errors, some may still exist.

## 2024-09-30 NOTE — TELEPHONE ENCOUNTER
HUB TO RELAY    CALLED AND LEFT PT A VM. [facility] IS NOT MEANT TO HAVE A 3:45 PM PT, CALLED PT TO SEE IF HE WOULD BE ABLE TO COME IN AT 3:15 PM. IF NOT PT WOULD NEED TO RESCHEDULE.

## 2024-09-30 NOTE — PATIENT INSTRUCTIONS

## 2024-09-30 NOTE — LETTER
[facility]  Vaccine Consent Form    Patient Name:  Mick Roa Jr.  Patient :  1995     Vaccine(s) Ordered    Tdap Vaccine Greater Than or Equal To 8yo IM        Screening Checklist  The following questions should be completed prior to vaccination. If you answer “yes” to any question, it does not necessarily mean you should not be vaccinated. It just means we may need to clarify or ask more questions. If a question is unclear, please ask your healthcare provider to explain it.    Yes No   Any fever or moderate to severe illness today (mild illness and/or antibiotic treatment are not contraindications)?     Do you have a history of a serious reaction to any previous vaccinations, such as anaphylaxis, encephalopathy within 7 days, Guillain-Rochert syndrome within 6 weeks, seizure?     Have you received any live vaccine(s) (e.g MMR, MARGUERITE) or any other vaccines in the last month (to ensure duplicate doses aren't given)?     Do you have an anaphylactic allergy to latex (DTaP, DTaP-IPV, Hep A, Hep B, MenB, RV, Td, Tdap), baker’s yeast (Hep B, HPV), polysorbates (RSV, nirsevimab, PCV 20, Rotavirrus, Tdap, Shingrix), or gelatin (MARGUERITE, MMR)?     Do you have an anaphylactic allergy to neomycin (Rabies, MARGUERITE, MMR, IPV, Hep A), polymyxin B (IPV), or streptomycin (IPV)?      Any cancer, leukemia, AIDS, or other immune system disorder? (MARGUERITE, MMR, RV)     Do you have a parent, brother, or sister with an immune system problem (if immune competence of vaccine recipient clinically verified, can proceed)? (MMR, MARGUERITE)     Any recent steroid treatments for >2 weeks, chemotherapy, or radiation treatment? (MARGUERITE, MMR)     Have you received antibody-containing blood transfusions or IVIG in the past 11 months (recommended interval is dependent on product)? (MMR, MARGUERITE)     Have you taken antiviral drugs (acyclovir, famciclovir, valacyclovir for MARGUERITE) in the last 24 or 48 hours, respectively?      Are you pregnant or  "planning to become pregnant within 1 month? (MARGUERITE, MMR, HPV, IPV, MenB, Abrexvy; For Hep B- refer to Engerix-B; For RSV - Abrysvo is indicated for 32-36 weeks of pregnancy from September to January)     For infants, have you ever been told your child has had intussusception or a medical emergency involving obstruction of the intestine (Rotavirus)? If not for an infant, can skip this question.         *Ordering Physicians/APC should be consulted if \"yes\" is checked by the patient or guardian above.  I have received, read, and understand the Vaccine Information Statement (VIS) for each vaccine ordered.  I have considered my or my child's health status as well as the health status of my close contacts.  I have taken the opportunity to discuss my vaccine questions with my or my child's health care provider.   I have requested that the ordered vaccine(s) be given to me or my child.  I understand the benefits and risks of the vaccines.  I understand that I should remain in the clinic for 15 minutes after receiving the vaccine(s).  _________________________________________________________  Signature of Patient or Parent/Legal Guardian ____________________  Date     "

## 2024-09-30 NOTE — ASSESSMENT & PLAN NOTE
"Patient reports concern about ongoing pain in his right hand  Patient reports remote traumatic injury and since that time he has continued to have pain and what appears to be a rolling tendon of his right third finger.  Patient reports he \"punched a piece of wood\" and has continued to have pain and discomfort since that time  Imaging ordered today-right hand x-ray  No change in treatment regimen pending imaging results  "

## 2024-09-30 NOTE — ASSESSMENT & PLAN NOTE
Patient seen by GI and is scheduled for screening colonoscopy  No change in treatment regimen at this time  Patient agrees to follow-up in 3 months unless otherwise indicated following colonoscopy.

## 2024-10-02 ENCOUNTER — OUTSIDE FACILITY SERVICE (OUTPATIENT)
Dept: GASTROENTEROLOGY | Facility: CLINIC | Age: 29
End: 2024-10-02
Payer: COMMERCIAL

## 2024-10-02 PROCEDURE — 45380 COLONOSCOPY AND BIOPSY: CPT | Performed by: INTERNAL MEDICINE

## 2024-10-02 PROCEDURE — 88305 TISSUE EXAM BY PATHOLOGIST: CPT | Performed by: INTERNAL MEDICINE

## 2024-10-03 ENCOUNTER — LAB REQUISITION (OUTPATIENT)
Dept: LAB | Facility: HOSPITAL | Age: 29
End: 2024-10-03
Payer: COMMERCIAL

## 2024-10-03 DIAGNOSIS — K52.9 NONINFECTIVE GASTROENTERITIS AND COLITIS, UNSPECIFIED: ICD-10-CM

## 2024-10-03 DIAGNOSIS — K62.5 HEMORRHAGE OF ANUS AND RECTUM: ICD-10-CM

## 2024-10-03 DIAGNOSIS — K92.2 GASTROINTESTINAL HEMORRHAGE, UNSPECIFIED: ICD-10-CM

## 2024-10-07 LAB — REF LAB TEST METHOD: NORMAL

## 2024-10-17 ENCOUNTER — OFFICE VISIT (OUTPATIENT)
Dept: GASTROENTEROLOGY | Facility: CLINIC | Age: 29
End: 2024-10-17
Payer: COMMERCIAL

## 2024-10-17 VITALS
BODY MASS INDEX: 31.51 KG/M2 | HEIGHT: 64 IN | WEIGHT: 184.6 LBS | TEMPERATURE: 98.2 F | HEART RATE: 85 BPM | SYSTOLIC BLOOD PRESSURE: 153 MMHG | DIASTOLIC BLOOD PRESSURE: 90 MMHG

## 2024-10-17 DIAGNOSIS — R19.7 DIARRHEA, UNSPECIFIED TYPE: ICD-10-CM

## 2024-10-17 DIAGNOSIS — K86.81 EXOCRINE PANCREATIC INSUFFICIENCY: Primary | ICD-10-CM

## 2024-10-17 PROCEDURE — 99213 OFFICE O/P EST LOW 20 MIN: CPT

## 2024-10-17 PROCEDURE — 1159F MED LIST DOCD IN RCRD: CPT

## 2024-10-17 PROCEDURE — 1160F RVW MEDS BY RX/DR IN RCRD: CPT

## 2024-10-17 NOTE — PROGRESS NOTES
"    Office Note     Name: Mick Roa Jr.    : 1995     MRN: [id number]     Chief Complaint  Follow-up    Subjective     History of Present Illness:  Mick Roa Jr. is a 28 y.o. male who presents today for follow-up of changes in bowel habits and recent hematochezia.  He reports food poisoning-like illness 4 years ago, with headache, chills, body aches. Since then, he's noticed worsening bowel habits, with frequent diarrhea, bloating, and abdominal pain. He had another episode with these symptoms in  with abdominal pain and diarrhea for several weeks and associated hematochezia, which has since resolved. He reports passage of mucous frequently, and is woken up almost every night with abdominal pain and bowel urgency.     Since his last office visit on , he has had an unremarkable colonoscopy and unremarkable stool studies, including negative fecal calprotectin GI panel, and fecal elastase.  He was recommended to try colestipol, which she is not taking. He take take briefly with significant improvement in his stool frequency, including not being woken up in the night to have a bowel movement. However, he states the stool felt \"sharp\" and caused rectal pain when having a bowel movement so he stopped. He continues to have some rectal bleeding, related to hemorrhoids.      Denies any dysphagia, odynophagia, nausea, vomiting, early satiety, unintended weight loss. He denies personal or family history of GI malignancies or IBD.     Past Medical History: History reviewed. No pertinent past medical history.    Past Surgical History:   Past Surgical History:   Procedure Laterality Date    COLONOSCOPY      HIP FRACTURE SURGERY Left        Immunizations:   Immunization History   Administered Date(s) Administered    Tdap 2024        Medications:     Current Outpatient Medications:     colestipol (COLESTID) 1 g tablet, Take 1 tablet by mouth 2 (Two) Times a Day. (Patient not " "taking: Reported on 10/17/2024), Disp: 60 tablet, Rfl: 11    pantoprazole (Protonix) 40 MG EC tablet, Take 1 tablet by mouth Daily. (Patient not taking: Reported on 10/17/2024), Disp: 30 tablet, Rfl: 3    PEG-KCl-NaCl-NaSulf-Na Asc-C (MoviPrep) 100 g reconstituted solution powder, Use as directed by provider for colonoscopy prep (Patient not taking: Reported on 10/17/2024), Disp: 1 each, Rfl: 0    polyethylene glycol (MiraLax) 17 g packet, Take 17 g by mouth Daily. (Patient not taking: Reported on 10/17/2024), Disp: , Rfl:     Allergies:   No Known Allergies    Family History:   Family History   Problem Relation Age of Onset    Brain cancer Paternal Grandfather     Diabetes Paternal Grandfather     Colon cancer Neg Hx        Social History:   Social History     Socioeconomic History    Marital status: Single   Tobacco Use    Smoking status: Never    Smokeless tobacco: Never   Vaping Use    Vaping status: Never Used   Substance and Sexual Activity    Alcohol use: Never    Drug use: Never    Sexual activity: Not Currently         Objective     Vital Signs  /90 (BP Location: Right arm, Patient Position: Sitting, Cuff Size: Adult)   Pulse 85   Temp 98.2 °F (36.8 °C) (Temporal)   Ht 162.6 cm (64.02\")   Wt 83.7 kg (184 lb 9.6 oz)   BMI 31.67 kg/m²   Estimated body mass index is 31.67 kg/m² as calculated from the following:    Height as of this encounter: 162.6 cm (64.02\").    Weight as of this encounter: 83.7 kg (184 lb 9.6 oz).            Physical Exam  Vitals reviewed.   Constitutional:       General: He is awake.   Cardiovascular:      Rate and Rhythm: Normal rate.   Pulmonary:      Effort: Pulmonary effort is normal.   Abdominal:      General: Bowel sounds are normal.      Palpations: Abdomen is soft.      Tenderness: There is no abdominal tenderness.   Skin:     General: Skin is warm and dry.      Capillary Refill: Capillary refill takes less than 2 seconds.   Neurological:      Mental Status: He is alert. "        Assessment and Plan     Assessment & Plan  Exocrine pancreatic insufficiency      Diarrhea, unspecified type  Will try a course of xifaxan to see if this improves his loose stools and significant bloating/discomfort.   I recommended that he get OTC suppositories with phenylephrine or hydrocortisone to shrink hemorrhoidal tissue so that he can better tolerate the colestipol, as it seems to have improved his frequency.   Also provided trial of creon, as most of his diarrhea occurs after meals, with stool description suspicious for EPI.              Follow Up  As needed    CHOLO Ahmadi  [facility] [address]  [facility]  [address] [address]

## 2024-10-29 ENCOUNTER — TELEPHONE (OUTPATIENT)
Dept: GASTROENTEROLOGY | Facility: CLINIC | Age: 29
End: 2024-10-29
Payer: COMMERCIAL

## 2024-10-29 DIAGNOSIS — K86.81 EXOCRINE PANCREATIC INSUFFICIENCY: ICD-10-CM

## 2024-10-29 DIAGNOSIS — R19.7 DIARRHEA, UNSPECIFIED TYPE: ICD-10-CM

## 2024-11-07 ENCOUNTER — OFFICE VISIT (OUTPATIENT)
Dept: FAMILY MEDICINE CLINIC | Facility: CLINIC | Age: 29
End: 2024-11-07
Payer: COMMERCIAL

## 2024-11-07 VITALS
BODY MASS INDEX: 32.47 KG/M2 | SYSTOLIC BLOOD PRESSURE: 130 MMHG | WEIGHT: 190.2 LBS | DIASTOLIC BLOOD PRESSURE: 80 MMHG | HEIGHT: 64 IN | OXYGEN SATURATION: 97 % | HEART RATE: 94 BPM | RESPIRATION RATE: 18 BRPM | TEMPERATURE: 98 F

## 2024-11-07 DIAGNOSIS — K40.91 UNILATERAL RECURRENT INGUINAL HERNIA WITHOUT OBSTRUCTION OR GANGRENE: Primary | ICD-10-CM

## 2024-11-07 DIAGNOSIS — K21.9 GASTROESOPHAGEAL REFLUX DISEASE WITHOUT ESOPHAGITIS: ICD-10-CM

## 2024-11-07 PROCEDURE — 99214 OFFICE O/P EST MOD 30 MIN: CPT

## 2024-11-07 PROCEDURE — 1126F AMNT PAIN NOTED NONE PRSNT: CPT

## 2024-11-07 NOTE — PROGRESS NOTES
Follow Up Office Visit      Date:  2024   Patient Name: Mick Roa Jr.  : 1995   MRN: [id number]     Chief Complaint   Patient presents with    Follow-up     Xray    Hemorrhoids    Shin Splints       History of Present Illness: Mick Roa Jr. is a 29 y.o. male who is here today for follow up     Hernia Repair:  -Hernia sx scheduled 11/10/24  -No changes since last appt  -colonoscopy was completed and is normal - path of tissue was normal  -questions about IBS  -working with GI - appt coming - Krysta Beltran seeing him    Tendon issue with right hand:  -rolling tendon    Mouth issue:  -dryness in mouth  -drinking plenty of fluids   -warm salt water not helping with dryness   -stop stretching it out    Working out:  -shin splints  -concerned about fx with shin splints  -shoes - run/walk shop for shoes       Subjective      History reviewed. No pertinent past medical history.    Past Surgical History:   Procedure Laterality Date    COLONOSCOPY      HIP FRACTURE SURGERY Left        Family History   Problem Relation Age of Onset    Brain cancer Paternal Grandfather     Diabetes Paternal Grandfather     Colon cancer Neg Hx        Social History     Socioeconomic History    Marital status: Single   Tobacco Use    Smoking status: Never     Passive exposure: Never    Smokeless tobacco: Never   Vaping Use    Vaping status: Never Used   Substance and Sexual Activity    Alcohol use: Never    Drug use: Never    Sexual activity: Not Currently       Current Outpatient Medications   Medication Instructions    dicyclomine (BENTYL) 10 MG capsule Take one by mouth 3 times daily 30 minutes before meals    nystatin (MYCOSTATIN) 100,000 unit/mL suspension RINSE WITH 5ML FOR AT LEAST 2 MINUTES AND THEN SWALLOW 4 TIMES DAILY    nystatin-triamcinolone (MYCOLOG) 656250-0.1 UNIT/GM-% ointment APPLY TO LIPS INCLUDING INSIDE BORDER 2-4 TIMES DAILY UNTIL HEALED    Pancrelipase, Lip-Prot-Amyl,  "(Creon) 82297-425889 units capsule delayed-release particles capsule Take two pills by mouth 30 minutes before breakfast, lunch, dinner and 1 pill by mouth before snack    pantoprazole (PROTONIX) 40 mg, Oral, Daily    riFAXIMin (XIFAXAN) 550 mg, Oral, Every 8 Hours Scheduled       No Known Allergies    Objective     Physical Exam  Vitals and nursing note reviewed.   Constitutional:       General: He is not in acute distress.     Appearance: Normal appearance. He is not toxic-appearing.   HENT:      Head: Normocephalic and atraumatic.      Right Ear: Tympanic membrane, ear canal and external ear normal.      Left Ear: Tympanic membrane, ear canal and external ear normal.   Eyes:      Pupils: Pupils are equal, round, and reactive to light.   Cardiovascular:      Rate and Rhythm: Normal rate and regular rhythm.      Pulses: Normal pulses.      Heart sounds: Normal heart sounds. No murmur heard.     No gallop.   Pulmonary:      Effort: Pulmonary effort is normal. No respiratory distress.      Breath sounds: Normal breath sounds.   Musculoskeletal:         General: Normal range of motion.   Skin:     General: Skin is warm and dry.   Neurological:      General: No focal deficit present.      Mental Status: He is alert and oriented to person, place, and time.      Motor: No weakness.      Coordination: Coordination normal.   Psychiatric:         Mood and Affect: Mood normal.         Behavior: Behavior normal.       Vitals:    11/07/24 1152   BP: 130/80   BP Location: Left arm   Patient Position: Sitting   Cuff Size: Large Adult   Pulse: 94   Resp: 18   Temp: 98 °F (36.7 °C)   TempSrc: Oral   SpO2: 97%   Weight: 86.3 kg (190 lb 3.2 oz)   Height: 162.6 cm (64.02\")   PainSc: 0-No pain     Body mass index is 32.63 kg/m².     No results found for this or any previous visit.     The ASCVD Risk score ([place] DK, et al., 2019) failed to calculate for the following reasons:    The 2019 ASCVD risk score is only valid for ages 40 to " 79         Assessment / Plan      Diagnoses and all orders for this visit:    1. Unilateral recurrent inguinal hernia without obstruction or gangrene (Primary)  Assessment & Plan:  Hernia sx scheduled 11/10/24  -No changes since last appt  -colonoscopy was completed and is normal - path of tissue was normal  -questions about IBS  -working with GI - appt coming - Krysta Ruby seeing him  Plan:  No change in treatment regimen at this time  Follow-up in 6 months unless otherwise indicated  Patient prefers to follow-up at the end of December      2. Gastroesophageal reflux disease without esophagitis       Patient Education:   Reviewed medications, potential side effects and signs and symptoms to report.   Discussed risk vs benefits of treatment plan with patient including medications, labs, and imaging.    Patient education materials attached to AVS and reviewed with patient during office visit today.   Addressed questions and concerns during visit. Patient verbalized understanding and agrees with tx plan.   Instructed to call the office with any questions and report to ER with any life-threatening symptoms.    Return in about 6 months (around 5/7/2025) for Follow-up.    CHOLO Abbott (Libby)  [facility]   [address] [facility] [id number] [phone]    NOTE TO PATIENT: The 21st Century Cures Act makes medical notes like these available to patients in the interest of transparency. However, be advised this is a medical document. It is intended as peer to peer communication. It is written in medical language and may contain abbreviations or verbiage that are unfamiliar. It may appear blunt or direct. Medical documents are intended to carry relevant information, facts as evident, and the clinical opinion of the practitioner.      EMR Dragon/Transcription disclaimer: Much of this encounter note is an electronic transcription of spoken language to printed text. Electronic  transcription of spoken language may permit erroneous, or at times, nonsensical words or phrases to be inadvertently transcribed. Although I have reviewed the note for such errors, some may still exist.

## 2024-11-07 NOTE — PATIENT INSTRUCTIONS

## 2024-11-19 ENCOUNTER — OFFICE VISIT (OUTPATIENT)
Dept: GASTROENTEROLOGY | Facility: CLINIC | Age: 29
End: 2024-11-19
Payer: COMMERCIAL

## 2024-11-19 VITALS
HEART RATE: 86 BPM | DIASTOLIC BLOOD PRESSURE: 87 MMHG | HEIGHT: 64 IN | WEIGHT: 186 LBS | BODY MASS INDEX: 31.76 KG/M2 | SYSTOLIC BLOOD PRESSURE: 127 MMHG | TEMPERATURE: 98 F

## 2024-11-19 DIAGNOSIS — K58.0 IRRITABLE BOWEL SYNDROME WITH DIARRHEA: ICD-10-CM

## 2024-11-19 DIAGNOSIS — K21.9 GASTROESOPHAGEAL REFLUX DISEASE, UNSPECIFIED WHETHER ESOPHAGITIS PRESENT: Primary | ICD-10-CM

## 2024-11-19 PROCEDURE — 99213 OFFICE O/P EST LOW 20 MIN: CPT

## 2024-11-19 RX ORDER — NYSTATIN AND TRIAMCINOLONE ACETONIDE 100000; 1 [USP'U]/G; MG/G
OINTMENT TOPICAL
COMMUNITY
Start: 2024-11-08

## 2024-11-19 RX ORDER — PANTOPRAZOLE SODIUM 40 MG/1
40 TABLET, DELAYED RELEASE ORAL DAILY
Qty: 90 TABLET | Refills: 3 | Status: SHIPPED | OUTPATIENT
Start: 2024-11-19

## 2024-11-19 RX ORDER — MONTELUKAST SODIUM 4 MG/1
1 TABLET, CHEWABLE ORAL 2 TIMES DAILY
Qty: 60 TABLET | Refills: 11 | Status: SHIPPED | OUTPATIENT
Start: 2024-11-19 | End: 2024-11-19

## 2024-11-19 RX ORDER — NYSTATIN 100000 [USP'U]/ML
SUSPENSION ORAL
COMMUNITY
Start: 2024-11-08

## 2024-11-19 RX ORDER — DICYCLOMINE HYDROCHLORIDE 10 MG/1
CAPSULE ORAL
Qty: 90 CAPSULE | Refills: 1 | Status: SHIPPED | OUTPATIENT
Start: 2024-11-19

## 2024-11-19 NOTE — PATIENT INSTRUCTIONS
Here is a list of low-FODMAP (fermentable oligosaccharides disaccharides monosaccharides and polyols) foods you can try eliminating from your diet, to see if it has any effect on your symptoms.    Fermentable: Wheat, barley, rye, onion, leak, white part of spring onion, garlic, shallots, artichokes, beet root, phenyl, peas, chicory, pistachio, cashews, legumes, lentils, and chickpeas  Oligosaccharides: Milk, custard, ice cream, and yogurt  Monosaccharides: Apples, pears, mangoes, cherries, watermelon, asparagus, sugar snap peas, honey, hypertense corn syrup  Polyols: Apples, pears, apricots, cherries, nectarines, peaches, plums, watermelon, mushrooms, cauliflower, artificially sweetened chewing gum and confectionary

## 2024-11-19 NOTE — PROGRESS NOTES
"    Office Note     Name: Mick Roa Jr.    : 1995     MRN: [id number]     Chief Complaint  Diarrhea    Subjective     History of Present Illness:  Mick Roa Jr. is a 29 y.o. male who presents today for follow-up of diarrhea.  He had an unremarkable colonoscopy and unremarkable stool studies, including negative fecal calprotectin, GI panel, and fecal elastase.  He was recommended to try colestipol, which he is not taking.  He took briefly with significant improvement in his stool frequency, including not being woken up in the middle of the night to have a bowel movement.  However, he states that the stool felt \"sharp\" and caused rectal pain when having a bowel movement, so he stopped taking colestipol.  At his last appointment on 10/17 he reported ongoing intermittent rectal bleeding.  He was initiated on Xifaxan at his last appointment and was given a trial of Creon.  He did not complete his trial of Xifaxan.  Since his last appointment he has had hernia surgery.    He has had improvement in the consistency of some of his stools, but feels like he's not having significant improvement in his diarrhea. He continues to have significant bloating/distension and discomfort at all times. He continues to take creon, but only notes intermittent improvement. Heat brings on his diarrhea. He denies any bleeding since his last appointment.     Past Medical History: History reviewed. No pertinent past medical history.    Past Surgical History:   Past Surgical History:   Procedure Laterality Date    COLONOSCOPY      HIP FRACTURE SURGERY Left        Immunizations:   Immunization History   Administered Date(s) Administered    Tdap 2024        Medications:     Current Outpatient Medications:     nystatin (MYCOSTATIN) 100,000 unit/mL suspension, RINSE WITH 5ML FOR AT LEAST 2 MINUTES AND THEN SWALLOW 4 TIMES DAILY, Disp: , Rfl:     nystatin-triamcinolone (MYCOLOG) 059127-8.1 UNIT/GM-% " "ointment, APPLY TO LIPS INCLUDING INSIDE BORDER 2-4 TIMES DAILY UNTIL HEALED, Disp: , Rfl:     Pancrelipase, Lip-Prot-Amyl, (Creon) 55834-855041 units capsule delayed-release particles capsule, Take two pills by mouth 30 minutes before breakfast, lunch, dinner and 1 pill by mouth before snack, Disp: 210 capsule, Rfl: 11    riFAXIMin (Xifaxan) 550 MG tablet, Take 1 tablet by mouth Every 8 (Eight) Hours., Disp: 42 tablet, Rfl: 0    Allergies:   No Known Allergies    Family History:   Family History   Problem Relation Age of Onset    Brain cancer Paternal Grandfather     Diabetes Paternal Grandfather     Colon cancer Neg Hx        Social History:   Social History     Socioeconomic History    Marital status: Single   Tobacco Use    Smoking status: Never     Passive exposure: Never    Smokeless tobacco: Never   Vaping Use    Vaping status: Never Used   Substance and Sexual Activity    Alcohol use: Never    Drug use: Never    Sexual activity: Not Currently         Objective     Vital Signs  /87 (BP Location: Left arm, Patient Position: Sitting, Cuff Size: Adult)   Pulse 86   Temp 98 °F (36.7 °C) (Temporal)   Ht 162.6 cm (64.02\")   Wt 84.4 kg (186 lb)   BMI 31.91 kg/m²   Estimated body mass index is 31.91 kg/m² as calculated from the following:    Height as of this encounter: 162.6 cm (64.02\").    Weight as of this encounter: 84.4 kg (186 lb).            Physical Exam  Vitals reviewed.   Constitutional:       General: He is awake.   Cardiovascular:      Rate and Rhythm: Normal rate.   Pulmonary:      Effort: Pulmonary effort is normal.   Abdominal:      General: Bowel sounds are normal.      Palpations: Abdomen is soft.      Tenderness: There is no abdominal tenderness.   Skin:     General: Skin is warm and dry.      Capillary Refill: Capillary refill takes less than 2 seconds.   Neurological:      Mental Status: He is alert.          Assessment and Plan     Assessment & Plan  Gastroesophageal reflux disease, " unspecified whether esophagitis present  Bloating is common with GERD, so will try at least a couple of months of PPI and see if this improves his symptoms.   Low fodmap diet provided.     Orders:    pantoprazole (PROTONIX) 40 MG EC tablet; Take 1 tablet by mouth Daily.    CT Abdomen Pelvis With & Without Contrast; Future    Irritable bowel syndrome with diarrhea  If no improvement with Bentyl, try the colestipol again, as it did seem to improve consistency of stool. His only issue with colestipol was rectal pain with the formed bowel movements, which may represent a fissure or hemorrhoids.   C-SBT test provided to evaluate for CSID, with his constant bloating/distension    Orders:    dicyclomine (BENTYL) 10 MG capsule; Take one by mouth 3 times daily 30 minutes before meals         Follow Up  3 months    CHOLO Ahmadi  [facility] [address]  [facility]  [address] [id number]

## 2024-11-24 NOTE — ASSESSMENT & PLAN NOTE
Hernia sx scheduled 11/10/24  -No changes since last appt  -colonoscopy was completed and is normal - path of tissue was normal  -questions about IBS  -working with GI - appt coming - Krysta Beltran seeing him  Plan:  No change in treatment regimen at this time  Follow-up in 6 months unless otherwise indicated  Patient prefers to follow-up at the end of December

## 2024-12-11 ENCOUNTER — HOSPITAL ENCOUNTER (OUTPATIENT)
Dept: CT IMAGING | Facility: HOSPITAL | Age: 29
Discharge: HOME | End: 2024-12-11
Payer: COMMERCIAL

## 2024-12-11 DIAGNOSIS — K21.9 GASTROESOPHAGEAL REFLUX DISEASE, UNSPECIFIED WHETHER ESOPHAGITIS PRESENT: ICD-10-CM

## 2024-12-11 PROCEDURE — 74178 CT ABD&PLV WO CNTR FLWD CNTR: CPT

## 2024-12-11 PROCEDURE — 25510000001 IOPAMIDOL 61 % SOLUTION

## 2024-12-11 RX ORDER — IOPAMIDOL 612 MG/ML
85 INJECTION, SOLUTION INTRAVASCULAR
Status: COMPLETED | OUTPATIENT
Start: 2024-12-11 | End: 2024-12-11

## 2024-12-11 RX ADMIN — IOPAMIDOL 85 ML: 612 INJECTION, SOLUTION INTRAVENOUS at 11:37

## 2024-12-27 ENCOUNTER — TELEPHONE (OUTPATIENT)
Dept: GASTROENTEROLOGY | Facility: CLINIC | Age: 29
End: 2024-12-27
Payer: COMMERCIAL

## 2024-12-27 NOTE — TELEPHONE ENCOUNTER
----- Message from Krysta Beltran sent at 12/26/2024  7:26 PM EST -----  Regarding: ct  Please let patient know CT did not show anything to explain his symptoms, and was essentially normal from a GI standpoint. He did have kidney stone, which was not obstructive and shouldn't be causing pain. Thanks!  CHOLO Ahmadi  ----- Message -----  From: Kasey, Rad Results [place] [facility]  Sent: 12/12/2024   9:57 AM EST  To: CHOLO Ahmadi

## 2024-12-27 NOTE — TELEPHONE ENCOUNTER
I called patient and no answer. I left him a voice message informing him of normal CT scan from GI standpoint.

## 2024-12-30 ENCOUNTER — OFFICE VISIT (OUTPATIENT)
Dept: FAMILY MEDICINE CLINIC | Facility: CLINIC | Age: 29
End: 2024-12-30
Payer: COMMERCIAL

## 2024-12-30 VITALS
HEART RATE: 78 BPM | OXYGEN SATURATION: 98 % | BODY MASS INDEX: 32.08 KG/M2 | SYSTOLIC BLOOD PRESSURE: 112 MMHG | HEIGHT: 64 IN | WEIGHT: 187.9 LBS | DIASTOLIC BLOOD PRESSURE: 78 MMHG

## 2024-12-30 DIAGNOSIS — N20.0 RENAL CALCULUS: ICD-10-CM

## 2024-12-30 DIAGNOSIS — Z00.00 ENCOUNTER FOR ADULT WELLNESS VISIT: Primary | ICD-10-CM

## 2024-12-30 DIAGNOSIS — M79.641 RIGHT HAND PAIN: ICD-10-CM

## 2024-12-30 PROBLEM — K46.9 UNSPECIFIED ABDOMINAL HERNIA WITHOUT OBSTRUCTION OR GANGRENE: Status: ACTIVE | Noted: 2024-12-30

## 2024-12-30 PROCEDURE — 1160F RVW MEDS BY RX/DR IN RCRD: CPT

## 2024-12-30 PROCEDURE — 1126F AMNT PAIN NOTED NONE PRSNT: CPT

## 2024-12-30 PROCEDURE — 1159F MED LIST DOCD IN RCRD: CPT

## 2024-12-30 PROCEDURE — 99213 OFFICE O/P EST LOW 20 MIN: CPT

## 2024-12-30 NOTE — ASSESSMENT & PLAN NOTE
Most recent CT scan on 12/12/2024 showed incidental finding of renal calculus  CT scan showed-kidneys normal in size. 2 mm nonobstructive right renal calculi. There is no hydronephrosis  We discussed symptoms of problematic renal calculi, patient affirms he is asymptomatic  Denies CVA/flank tenderness, hematuria, or urinary frequency  Discussed that course of treatment is watchful waiting and referral to urologist if he becomes symptomatic    Plan:  Monitor for symptoms  Patient verbalizes understanding if he develops high fever, chills, hematuria, chest pain, shortness of breath or other alarm symptoms he should call 911 or go to the ER as appropriate.  Follow-up as needed if symptoms develop or at routine wellness visits

## 2024-12-30 NOTE — PROGRESS NOTES
Office Note     Date: 2024   Name: Mick Roa Jr.  : 1995   MRN: [id number]     Chief Complaint  Primary Care Follow-Up    Warts - small ones on hands and wrists - using OTC       Subjective     History of Present Illness:  Mick Roa Jr. is a 29 y.o. male who presents today for several questions he has about his recent care.  He has completed hernia repair surgery and is recovering nicely from that and is also established with GI provider, CHOLO Ahmadi.  Patient denies any new illness or injury since last visit. Denies falls, unexplained weight change, fevers, chills, or other constitutional symptoms and has no additional questions or concens at this time.      Past Surgical History:   Procedure Laterality Date    COLONOSCOPY      HIP FRACTURE SURGERY Left        Immunization History   Administered Date(s) Administered    Tdap 2024        Current Outpatient Medications   Medication Instructions    dicyclomine (BENTYL) 10 MG capsule Take one by mouth 3 times daily 30 minutes before meals    nystatin (MYCOSTATIN) 100,000 unit/mL suspension RINSE WITH 5ML FOR AT LEAST 2 MINUTES AND THEN SWALLOW 4 TIMES DAILY    nystatin-triamcinolone (MYCOLOG) 385409-1.1 UNIT/GM-% ointment APPLY TO LIPS INCLUDING INSIDE BORDER 2-4 TIMES DAILY UNTIL HEALED    Pancrelipase, Lip-Prot-Amyl, (Creon) 16826-508778 units capsule delayed-release particles capsule Take two pills by mouth 30 minutes before breakfast, lunch, dinner and 1 pill by mouth before snack    pantoprazole (PROTONIX) 40 mg, Oral, Daily    riFAXIMin (XIFAXAN) 550 mg, Oral, Every 8 Hours Scheduled        Allergies   Allergen Reactions    Latex Rash       Family History   Problem Relation Age of Onset    Brain cancer Paternal Grandfather     Diabetes Paternal Grandfather     Colon cancer Neg Hx        Social History     Socioeconomic History    Marital status: Single   Tobacco Use    Smoking status: Never      "Passive exposure: Never    Smokeless tobacco: Never   Vaping Use    Vaping status: Never Used   Substance and Sexual Activity    Alcohol use: Never    Drug use: Never    Sexual activity: Not Currently         Objective     /78 (BP Location: Left arm, Patient Position: Sitting, Cuff Size: Large Adult)   Pulse 78   Ht 162.6 cm (64.02\")   Wt 85.2 kg (187 lb 14.4 oz)   SpO2 98%   BMI 32.24 kg/m²   Estimated body mass index is 32.24 kg/m² as calculated from the following:    Height as of this encounter: 162.6 cm (64.02\").    Weight as of this encounter: 85.2 kg (187 lb 14.4 oz).      Physical Exam  Vitals and nursing note reviewed.   Constitutional:       General: He is not in acute distress.     Appearance: Normal appearance. He is not toxic-appearing.   HENT:      Head: Normocephalic.   Eyes:      Pupils: Pupils are equal, round, and reactive to light.   Cardiovascular:      Rate and Rhythm: Normal rate and regular rhythm.      Heart sounds: No murmur heard.  Pulmonary:      Effort: Pulmonary effort is normal. No respiratory distress.      Breath sounds: Normal breath sounds.   Musculoskeletal:         General: Normal range of motion.      Right hand: No swelling, tenderness or bony tenderness. Deformity: Slight visual difference between right and left third finger MIP joints.Normal range of motion. Normal strength. Normal sensation. Normal capillary refill. Normal pulse.      Left hand: Normal.      Cervical back: Normal range of motion and neck supple.      Right lower leg: No edema.      Left lower leg: No edema.   Skin:     General: Skin is warm and dry.   Neurological:      Mental Status: He is alert.   Psychiatric:         Mood and Affect: Mood normal.         Behavior: Behavior normal.          Assessment and Plan     Diagnoses and all orders for this visit:    1. Encounter for adult wellness visit (Primary)  Assessment & Plan:  Mick Roa Jr.  here for follow-up wellness visit due to " "having some concerns and questions about recent health care.    Main concerns today are frequency of bowel movements and new medication question, ongoing issue with tendon and right hand, and incidental finding of renal stone on most recent CT scan.  Routine 6-month follow-up appointment scheduled in May      2. Right hand pain  Assessment & Plan:  Imaging of right hand, x-ray at last appointment was negative for obvious injury  Patient is concerned about right third finger tendon he feels is rolling or \"popping\" which he feels is affecting his   Motor and neuro assessments grossly normal  We discussed physical therapy, patient declines at this time as he is concerned about causing further injury if there is tendon abnormality  Pt would like to pursue referral to hand specialist for evaluation and additional imaging    Plan:  Patient declined PT at this time   Referral placed to hand specialist per patient request    Orders:  -     Ambulatory Referral to Hand Surgery    3. Renal calculus  Assessment & Plan:  Most recent CT scan on 12/12/2024 showed incidental finding of renal calculus  CT scan showed-kidneys normal in size. 2 mm nonobstructive right renal calculi. There is no hydronephrosis  We discussed symptoms of problematic renal calculi, patient affirms he is asymptomatic  Denies CVA/flank tenderness, hematuria, or urinary frequency  Discussed that course of treatment is watchful waiting and referral to urologist if he becomes symptomatic    Plan:  Monitor for symptoms  Patient verbalizes understanding if he develops high fever, chills, hematuria, chest pain, shortness of breath or other alarm symptoms he should call 911 or go to the ER as appropriate.  Follow-up as needed if symptoms develop or at routine wellness visits      Patient Education:   Reviewed medications, potential side effects and signs and symptoms to report.   Discussed risk vs benefits of treatment plan with patient including medications, " labs, and imaging.    Patient education materials attached to AVS and reviewed with patient during office visit today.   Addressed questions and concerns during visit. Patient verbalized understanding and agrees with tx plan.   Instructed to call the office with any questions and report to ER with any life-threatening symptoms.    Follow Up  Return for scheduled follow up, unless otherwise indicated..    CHOLO Abbott (Libby) [facility]  [address] [place] [id number] [phone]    NOTE TO PATIENT:   The 21st Century Cures Act makes medical notes like these available to patients in the interest of transparency. However, be advised this is a medical document. It is intended as peer to peer communication. It is written in medical language and may contain abbreviations or verbiage that are unfamiliar. It may appear blunt or direct. Medical documents are intended to carry relevant information, facts as evident, and the clinical opinion of the practitioner.     EMR Dragon/Transcription disclaimer:  Much of this encounter note is an electronic transcription of spoken language to printed text. Electronic transcription of spoken language may permit erroneous, or at times, nonsensical words or phrases to be inadvertently transcribed. Although I have reviewed the note for such errors, some may still exist.

## 2024-12-30 NOTE — ASSESSMENT & PLAN NOTE
"Imaging of right hand, x-ray at last appointment was negative for obvious injury  Patient is concerned about right third finger tendon he feels is rolling or \"popping\" which he feels is affecting his   Motor and neuro assessments grossly normal  We discussed physical therapy, patient declines at this time as he is concerned about causing further injury if there is tendon abnormality  Pt would like to pursue referral to hand specialist for evaluation and additional imaging    Plan:  Patient declined PT at this time   Referral placed to hand specialist per patient request  "

## 2024-12-30 NOTE — ASSESSMENT & PLAN NOTE
Mick Roa Jr.  here for follow-up wellness visit due to having some concerns and questions about recent health care.    Main concerns today are frequency of bowel movements and new medication question, ongoing issue with tendon and right hand, and incidental finding of renal stone on most recent CT scan.  Routine 6-month follow-up appointment scheduled in May

## 2024-12-30 NOTE — PATIENT INSTRUCTIONS

## 2025-02-14 ENCOUNTER — OFFICE VISIT (OUTPATIENT)
Dept: ORTHOPEDIC SURGERY | Facility: CLINIC | Age: 30
End: 2025-02-14
Payer: COMMERCIAL

## 2025-02-14 VITALS — WEIGHT: 185 LBS | HEIGHT: 64 IN | BODY MASS INDEX: 31.58 KG/M2

## 2025-02-14 DIAGNOSIS — M25.541 METACARPOPHALANGEAL JOINT PAIN OF RIGHT HAND: Primary | ICD-10-CM

## 2025-02-14 NOTE — PROGRESS NOTES
[facility] Orthopedic     Office Visit       Date: 02/14/2025   Patient Name: Mick Roa Jr.  MRN: [id number]  YOB: 1995    Referring Physician: Chelita Zelaya A*     Chief Complaint:   Chief Complaint   Patient presents with    Right Hand - Pain       History of Present Illness:   Mick Roa Jr. is a 29 y.o. male right-hand-dominant presents with right middle finger MCP pain of 8 months duration.  Parent patient reports pain in his right middle finger MCP after punching the ground approximately 8 months ago.  Reports immediate pain and swelling of his right middle finger at the MCP joint.  Reports that pain and swelling slowly improved however he continues to have incidences with increased pain of the right middle and finger MCP with possible tendon subluxation.  He reports occasional popping or catching with heavy lifting with his right hand.  Reports last event was 1 to 2 months ago.  He otherwise has no pain at rest.  He has full range of motion.  He is not currently employed.  He denies smoking.  He is otherwise healthy.      Subjective   Review of Systems:   Review of Systems   Constitutional:  Negative for chills, fever, unexpected weight gain and unexpected weight loss.   HENT:  Negative for congestion, postnasal drip and rhinorrhea.    Eyes:  Negative for blurred vision.   Respiratory:  Negative for shortness of breath.    Cardiovascular:  Negative for leg swelling.   Gastrointestinal:  Negative for abdominal pain, nausea and vomiting.   Genitourinary:  Negative for difficulty urinating.   Musculoskeletal:  Positive for arthralgias. Negative for gait problem, joint swelling and myalgias.   Skin:  Negative for skin lesions and wound.   Neurological:  Negative for dizziness, weakness, light-headedness and numbness.   Hematological:  Does not bruise/bleed easily.   Psychiatric/Behavioral:   "Negative for depressed mood.         Pertinent review of systems per HPI.     I reviewed the patient's chief complaint, history of present illness, review of systems, past medical history, surgical history, family history, social history, medications and allergy list in the EMR on 02/14/2025 and agree with the findings above.    Objective    Vital Signs:   Vitals:    02/14/25 1346   Weight: 83.9 kg (185 lb)   Height: 162.6 cm (64\")     BMI: Body mass index is 31.76 kg/m².    General Appearance: No acute distress. Alert and oriented.     Chest:  Non-labored breathing on room air. Regular rate and rhythm.    Upper Extremity Exam:    Nontender palpation over the right middle finger at the MCP.  No obvious deformity.  Stable to varus and valgus stress.  Slight subluxation of the extensor tendon with flexion but no obvious snapping or popping.  Full flexion full extension of the middle finger MCP    Fingers are warm, well-perfused with appropriate capillary refill.  Palpable radial pulse.    Sensation intact to light touch in median, radial and ulnar nerve distributions.    Motor- Fires [employer], ulnar intrinsics, EPL/EDC w/ full active and passive range of motion. Strength intact.    Non-tender except for in the areas highlighted    Imaging/Studies:   Imaging Results (Last 24 Hours)       ** No results found for the last 24 hours. **            X-ray of the right hand from October 2024 is independently reviewed and interpreted myself and demonstrates no evidence of bony abnormality.    Procedures:  Procedures    Quality Measures:   ACP:   ACP discussion was deferred.    Tobacco:   Mick Roa Jr.  reports that he has never smoked. He has never been exposed to tobacco smoke. He has never used smokeless tobacco.      Assessment / Plan    Assessment/Plan:     There are no diagnoses linked to this encounter.     Mick Roa Jr.is a 29 y.o. male who presents with:      ICD-10-CM ICD-9-CM   1. " Metacarpophalangeal joint pain of right hand  M25.541 719.44         Patient presents with intermittent right middle finger pain as well as popping after punching the ground approximately 8 months ago.  No obvious evidence of extensor subluxation on exam I am concerned for possible sagittal band rupture with intermittent extensor subluxation.  Discussed treatment options with the patient.  Recommend MRI of the right hand to confirm diagnosis.  Recommend patient gradually increase activity as tolerated to see if symptoms worsen.  Recommend follow-up with me after MRI to discuss the results.    Follow Up:   Return for Follow-up after Imaging.        Vince Prado MD  [facility] Hand and Upper Extremity Surgeon

## 2025-02-19 ENCOUNTER — TELEPHONE (OUTPATIENT)
Dept: FAMILY MEDICINE CLINIC | Facility: CLINIC | Age: 30
End: 2025-02-19
Payer: COMMERCIAL

## 2025-02-19 NOTE — TELEPHONE ENCOUNTER
HUB TO RELAY    PLEASE LET PATIENT KNOW HIS APPOINTMENT WITH [facility] TOMORROW NEEDS TO BE RESCHEDULED. I LEFT PATIENT A VOICEMAIL MAKING HIM AWARE.     PLEASE RESCHEDULE PATIENT AT PROVIDERS NEXT AVAILABLE APPOINTMENT.

## 2025-02-26 ENCOUNTER — OFFICE VISIT (OUTPATIENT)
Dept: FAMILY MEDICINE CLINIC | Facility: CLINIC | Age: 30
End: 2025-02-26
Payer: COMMERCIAL

## 2025-02-26 VITALS
DIASTOLIC BLOOD PRESSURE: 70 MMHG | WEIGHT: 194.3 LBS | OXYGEN SATURATION: 98 % | BODY MASS INDEX: 33.17 KG/M2 | HEIGHT: 64 IN | RESPIRATION RATE: 16 BRPM | HEART RATE: 77 BPM | SYSTOLIC BLOOD PRESSURE: 130 MMHG | TEMPERATURE: 98.1 F

## 2025-02-26 DIAGNOSIS — H57.89 EYE SWELLING, BILATERAL: ICD-10-CM

## 2025-02-26 DIAGNOSIS — G89.29 TOE PAIN, CHRONIC, RIGHT: Primary | ICD-10-CM

## 2025-02-26 DIAGNOSIS — M79.674 TOE PAIN, CHRONIC, RIGHT: Primary | ICD-10-CM

## 2025-02-26 PROCEDURE — 1159F MED LIST DOCD IN RCRD: CPT

## 2025-02-26 PROCEDURE — 1160F RVW MEDS BY RX/DR IN RCRD: CPT

## 2025-02-26 PROCEDURE — 99214 OFFICE O/P EST MOD 30 MIN: CPT

## 2025-02-26 PROCEDURE — 1126F AMNT PAIN NOTED NONE PRSNT: CPT

## 2025-02-26 NOTE — PROGRESS NOTES
Acute Office Visit      Date: 2025  Patient Name: Mick Roa Jr.  : 1995   MRN: [id number]     Chief Complaint   Patient presents with    Toe Pain     Been going on about a month    Facial Swelling     Going on a little over a week mainly left eye        History of Present Illness: Mick Roa Jr. is a 29 y.o. male who is here today with concerns about pain in his toe and recent puffiness under his eyes with associated intermittent twitch.    Pt reports about a month ago his right 4th toe developed increased sensitivity.     Toe Pain   The incident occurred more than 1 week ago. There was no injury mechanism. The pain is present in the right toes. The quality of the pain is described as burning and stabbing. The pain is at a severity of 7/10. The pain is severe. The pain has been Intermittent since onset. Associated symptoms include tingling. He reports no foreign bodies present. The symptoms are aggravated by movement. The treatment provided no relief.   Facial Swelling  Onset was 1 to 4 weeks.   Symptoms occur daily.   Symptoms have been unchanged since onset.   Aggravating factors include nothing.   Treatments tried include nothing.      -Warm epsom salt soaks - right 4th toe - not infected at this time  -Left eye twitch -   -Crying at movies and TV shows    Subjective     Current Outpatient Medications   Medication Instructions    dicyclomine (BENTYL) 10 MG capsule Take one by mouth 3 times daily 30 minutes before meals    nystatin (MYCOSTATIN) 100,000 unit/mL suspension RINSE WITH 5ML FOR AT LEAST 2 MINUTES AND THEN SWALLOW 4 TIMES DAILY    nystatin-triamcinolone (MYCOLOG) 087982-2.1 UNIT/GM-% ointment APPLY TO LIPS INCLUDING INSIDE BORDER 2-4 TIMES DAILY UNTIL HEALED    Pancrelipase, Lip-Prot-Amyl, (Creon) 56680-647014 units capsule delayed-release particles capsule Take two pills by mouth 30 minutes before breakfast, lunch, dinner and 1 pill by mouth before  "snack    pantoprazole (PROTONIX) 40 mg, Oral, Daily    riFAXIMin (XIFAXAN) 550 mg, Oral, Every 8 Hours Scheduled       Allergies   Allergen Reactions    Latex Rash       Objective     Vitals:    25 1420   BP: 130/70   BP Location: Right arm   Patient Position: Sitting   Cuff Size: Large Adult   Pulse: 77   Resp: 16   Temp: 98.1 °F (36.7 °C)   TempSrc: Oral   SpO2: 98%   Weight: 88.1 kg (194 lb 4.8 oz)   Height: 162.6 cm (64\")   PainSc: 0-No pain       Body mass index is 33.35 kg/m².     Physical Exam  Constitutional:       General: He is not in acute distress.     Appearance: Normal appearance. He is not toxic-appearing.   Pulmonary:      Effort: Pulmonary effort is normal. No respiratory distress.   Neurological:      Mental Status: He is alert and oriented to person, place, and time.   Psychiatric:         Mood and Affect: Mood normal.         Thought Content: Thought content normal.         Results for orders placed or performed in visit on 10/02/24   TISSUE EXAM, [facility] ([facility])    Collection Time: 10/02/24  3:00 PM    Specimen: Tissue   Result Value Ref Range    Reference Lab Report       [facility]  [address] [place] [address]  Phone: [phone] or [phone]  Fax: [phone]  Juan Carlos Adams M.D., Medical Director    PATIENT NAME                           LABORATORY NO.  ALISHA BILLINGS JR.        [id number]  [id number]                         AGE              SEX  SSN           CLIENT REF #  [facility]           28      1995      [id number]   [id number]    [address] [place]              REQUESTING M.D.     ATTENDING [place] [address]                KATYA KILGORE  DATE COLLECTED      DATE RECEIVED      DATE REPORTED  10/02/2024          10/03/2024         10/07/2024    DIAGNOSIS:  RANDOM COLON BIOPSY:  Colonic mucosa with no significant pathologic change    LASHAUNK    CLINICAL " "HISTORY:  Noninfective gastroenteritis and colitis, hemorrhage of anus and rectum,  gastrointestinal hemorrhage    SPECIMENS RECEIVED:  RANDOM COLON BIOPSY    MICROSCOPIC  DESCRIPTION:  Tissue blocks are prepared and slides are examined microscopically on all  specimens. See diagnosis for details.    Professional interpretation rendered by Jose Olmstead M.D., BRITTNEY at [facility], [address] [place] [address].    GROSS DESCRIPTION:  Labeled \"random colon\".  Consists of multiple pieces of tan soft tissue  measuring 0.5 x 0.4 x 0.2 cm in aggregate and is filtered and submitted entirely  in 1 block.  MATT    REVIEWED, DIAGNOSED AND ELECTRONICALLY  SIGNED BY:    Jose Olmstead M.D., DILLON.  CPT CODES:  24392          The ASCVD Risk score (Rhiannon GALINDO, et al., 2019) failed to calculate for the following reasons:    The 2019 ASCVD risk score is only valid for ages 40 to 79             Assessment / Plan      Assessment & Plan  Toe pain, chronic, right  Mild discomfort right 4th toe  Consistent with resolving ingrown toenail  Patient education materials attached to AVS related to ingrown nail.   Materials were reviewed with patient during his office visit today and all questions addressed.         Eye swelling, bilateral  Patient concerned about recent puffiness under his eyes  After discussion patient asks if crying could have caused his issue  We discussed that indeed crying can increase puffiness around the eyes temporarily  Patient reports recently watching several movies and TV shows that caused him to be more emotional than usual  Discussed that he is also had a mild twitch intermittently under his left eye  Discussed that this can be caused by increased stress or electrolyte imbalances  Patient is drinking only plenty of water daily, recommended adding no sugar sports drink to ensure electrolyte levels consistent  No change in treatment regimen  Patient to follow-up if symptoms do not improve or " continue to get worse         Follow Up:   Return in about 6 months (around 8/26/2025), or if symptoms worsen or fail to improve.    Patient Education:   Reviewed medications, potential side effects and signs and symptoms to report.   Discussed risk vs benefits of treatment plan with patient including medications, labs, and imaging.    Patient education materials attached to AVS and reviewed with patient during office visit today.   Addressed questions and concerns during visit. Patient verbalized understanding and agrees with tx plan.   Instructed to call the office with any questions and report to ER with any life-threatening symptoms.    [facility]  [address] [facility] [id number] [phone]    NOTE TO PATIENT:   The 21st Century Cures Act makes medical notes like these available to patients in the interest of transparency. However, be advised this is a medical document. It is intended as peer to peer communication. It is written in medical language and may contain abbreviations or verbiage that are unfamiliar. It may appear blunt or direct. Medical documents are intended to carry relevant information, facts as evident, and the clinical opinion of the practitioner.     EMR Dragon/Transcription disclaimer:   Much of this encounter note is an electronic transcription of spoken language to printed text. Electronic transcription of spoken language may permit erroneous, or at times, nonsensical words or phrases to be inadvertently transcribed. Although I have reviewed the note for such errors, some may still exist.

## 2025-02-26 NOTE — ASSESSMENT & PLAN NOTE
Mild discomfort right 4th toe  Consistent with resolving ingrown toenail  Patient education materials attached to AVS related to ingrown nail.   Materials were reviewed with patient during his office visit today and all questions addressed.

## 2025-02-26 NOTE — ASSESSMENT & PLAN NOTE
Patient concerned about recent puffiness under his eyes  After discussion patient asks if crying could have caused his issue  We discussed that indeed crying can increase puffiness around the eyes temporarily  Patient reports recently watching several movies and TV shows that caused him to be more emotional than usual  Discussed that he is also had a mild twitch intermittently under his left eye  Discussed that this can be caused by increased stress or electrolyte imbalances  Patient is drinking only plenty of water daily, recommended adding no sugar sports drink to ensure electrolyte levels consistent  No change in treatment regimen  Patient to follow-up if symptoms do not improve or continue to get worse

## 2025-03-02 ENCOUNTER — HOSPITAL ENCOUNTER (OUTPATIENT)
Facility: HOSPITAL | Age: 30
Discharge: HOME | End: 2025-03-02
Admitting: PLASTIC SURGERY
Payer: COMMERCIAL

## 2025-03-02 DIAGNOSIS — M25.541 METACARPOPHALANGEAL JOINT PAIN OF RIGHT HAND: ICD-10-CM

## 2025-03-02 PROCEDURE — 73218 MRI UPPER EXTREMITY W/O DYE: CPT

## 2025-03-14 ENCOUNTER — OFFICE VISIT (OUTPATIENT)
Dept: ORTHOPEDIC SURGERY | Facility: CLINIC | Age: 30
End: 2025-03-14
Payer: COMMERCIAL

## 2025-03-14 DIAGNOSIS — M25.541 METACARPOPHALANGEAL JOINT PAIN OF RIGHT HAND: Primary | ICD-10-CM

## 2025-03-14 RX ORDER — HYDROCODONE BITARTRATE AND ACETAMINOPHEN 7.5; 325 MG/15ML; MG/15ML
SOLUTION ORAL
COMMUNITY
Start: 2025-03-04

## 2025-03-14 NOTE — PROGRESS NOTES
[facility]     Follow-up Office Visit       Date: 03/14/2025   Patient Name: Mick Roa Jr.  MRN: [id number]  YOB: 1995    Chief Complaint:   Chief Complaint   Patient presents with    Follow-up     4 WEEK FOLLOW UP -Metacarpophalangeal joint pain of right hand -mri PREFORMED 3/2/25           History of Present Illness:   Mick Roa Jr. is a 29 y.o. male presents for follow-up of right middle finger pain at the MCP.  He has been gradually increasing his activity since his last visit.  Reports he has had no episodes of extensor tendon subluxation or extreme pain.  Reports it does feel stiff and occasionally sore with use.  He did have an MRI done which demonstrated irregular appearance of the radial sagittal band of the middle finger MCP without evidence of extensor subluxation.      Subjective   Review of Systems:   Review of Systems   Constitutional:  Negative for chills, fever, unexpected weight gain and unexpected weight loss.   HENT:  Negative for congestion, postnasal drip and rhinorrhea.    Eyes:  Negative for blurred vision.   Respiratory:  Negative for shortness of breath.    Cardiovascular:  Negative for leg swelling.   Gastrointestinal:  Negative for abdominal pain, nausea and vomiting.   Genitourinary:  Negative for difficulty urinating.   Musculoskeletal:  Positive for arthralgias. Negative for gait problem, joint swelling and myalgias.   Skin:  Negative for skin lesions and wound.   Neurological:  Negative for dizziness, weakness, light-headedness and numbness.   Hematological:  Does not bruise/bleed easily.   Psychiatric/Behavioral:  Negative for depressed mood.         Pertinent review of systems per HPI    I reviewed the patient's chief complaint, history of present illness, review of systems, past medical history, surgical history, family history, social history,  medications and allergy list in the EMR on 03/14/2025 and agree with the findings above.    Objective    Vital Signs: There were no vitals filed for this visit.  BMI: There is no height or weight on file to calculate BMI.     General Appearance: No acute distress. Alert and oriented.     Chest:  Non-labored breathing on room air      Ortho Exam:  Nontender palpation of the right middle finger MCP joint.  Extensor tenderness stable with both flexion and extension of the middle finger.  Full range of motion.  Fingers warm and well-perfused distally  Sensation intact to light touch in the median, radial and ulnar nerve distributions    Imaging/Studies:   Imaging Results (Last 24 Hours)       ** No results found for the last 24 hours. **            MRI of the right hand from 3/2/2025 was independently viewed and interpreted myself demonstrate some scarring at the radial sagittal band of the right middle finger without evidence of subluxation or persistent rupture.    Procedures:  Procedures    Quality Measures:   ACP:   ACP discussion was deferred.    Tobacco:   Mick Eatonmadison Roa Jr.  reports that he has never smoked. He has never been exposed to tobacco smoke. He has never used smokeless tobacco.    Assessment / Plan    Assessment/Plan:      Diagnosis Plan   1. Metacarpophalangeal joint pain of right hand            Patient presents for follow-up of right middle finger pain at the MCP.  He has evidence of a healed radial sagittal band injury on MRI and has no evidence of subluxation on exam today.  He has had no episodes of pain since increasing activity since his last visit.  Recommend continue home exercise program and follow-up as needed if symptoms worsen.    Follow Up:   Return if symptoms worsen or fail to improve.        Vince Prado MD  [facility] Surgeon

## 2025-04-01 ENCOUNTER — TELEPHONE (OUTPATIENT)
Dept: GASTROENTEROLOGY | Facility: CLINIC | Age: 30
End: 2025-04-01
Payer: COMMERCIAL

## 2025-04-04 ENCOUNTER — TELEPHONE (OUTPATIENT)
Dept: GASTROENTEROLOGY | Facility: CLINIC | Age: 30
End: 2025-04-04
Payer: COMMERCIAL

## 2025-06-23 ENCOUNTER — OFFICE VISIT (OUTPATIENT)
Dept: FAMILY MEDICINE CLINIC | Facility: CLINIC | Age: 30
End: 2025-06-23
Payer: COMMERCIAL

## 2025-06-23 VITALS
WEIGHT: 198.2 LBS | HEART RATE: 84 BPM | SYSTOLIC BLOOD PRESSURE: 132 MMHG | DIASTOLIC BLOOD PRESSURE: 92 MMHG | HEIGHT: 64 IN | OXYGEN SATURATION: 98 % | BODY MASS INDEX: 33.84 KG/M2

## 2025-06-23 DIAGNOSIS — L70.0 ACNE VULGARIS: Primary | ICD-10-CM

## 2025-06-23 DIAGNOSIS — Z78.9 UNCIRCUMCISED MALE: ICD-10-CM

## 2025-06-23 PROCEDURE — 99214 OFFICE O/P EST MOD 30 MIN: CPT | Performed by: PHYSICIAN ASSISTANT

## 2025-06-23 PROCEDURE — 1126F AMNT PAIN NOTED NONE PRSNT: CPT | Performed by: PHYSICIAN ASSISTANT

## 2025-06-23 RX ORDER — ADAPALENE 0.1 G/100G
1 CREAM TOPICAL NIGHTLY
Qty: 45 G | Refills: 12 | Status: SHIPPED | OUTPATIENT
Start: 2025-06-23

## 2025-06-23 RX ORDER — BENZOYL PEROXIDE 50 MG/ML
LIQUID TOPICAL 2 TIMES DAILY
Qty: 236 ML | Refills: 12 | Status: SHIPPED | OUTPATIENT
Start: 2025-06-23 | End: 2026-06-23

## 2025-06-23 NOTE — ASSESSMENT & PLAN NOTE
Use benzyl peroxide wash, plain Ivory bar soap to clean skin, Differin gel.  Use an oil free moisturizing sunscreen.  Refer to dermatology.

## 2025-06-23 NOTE — PROGRESS NOTES
Patient Office Visit      Patient Name: Mick Roa Jr.  : 1995   MRN: [id number]     Chief Complaint:    Chief Complaint   Patient presents with   • Rash   • Acne     Dermatology referral request.       History of Present Illness: Mick Roa Jr. is a 29 y.o. male who is here today with a couple of different concerns.  Irritation of the foreskin, he said he has been known to cleanse too vigorously.  He also has a history of yeast infections and has been using over-the-counter clotrimazole cream.  He complains of acne on his forehead and his upper back and is requesting dermatology referral.    Subjective      Review of Systems:         Past Medical History:   Past Medical History:   Diagnosis Date   • Fracture of hip     Had surgeries for it   • Fracture of wrist     Healed       Past Surgical History:   Past Surgical History:   Procedure Laterality Date   • COLONOSCOPY     • HERNIA REPAIR     • HIP FRACTURE SURGERY Left    • TONSILLECTOMY         Family History:   Family History   Problem Relation Age of Onset   • Brain cancer Paternal Grandfather    • Diabetes Paternal Grandfather    • Cancer Maternal Grandfather         Brain tumor (not sure what cancer it was)   • Cancer Maternal Grandmother         Breast cancer   • Colon cancer Neg Hx        Social History:   Social History     Socioeconomic History   • Marital status: Single   Tobacco Use   • Smoking status: Never     Passive exposure: Never   • Smokeless tobacco: Never   Vaping Use   • Vaping status: Never Used   Substance and Sexual Activity   • Alcohol use: Not Currently     Comment: Drank during the year , never drank before nor after   • Drug use: Never   • Sexual activity: Not Currently       Allergies:   Allergies   Allergen Reactions   • Latex Rash       Objective     Physical Exam:  Vital Signs:   Vitals:    25 1019   BP: 132/92   BP Location: Left arm   Patient Position: Sitting   Cuff  "Size: Adult   Pulse: 84   SpO2: 98%   Weight: 89.9 kg (198 lb 3.2 oz)   Height: 162.6 cm (64\")   PainSc: 0-No pain     Body mass index is 34.02 kg/m².           Physical Exam  Constitutional:       Appearance: Normal appearance.   Genitourinary:     Penis: Normal and uncircumcised.       Comments: Minimal skin irritation but no evidence of rash.  Also examined by Dr. Collado.  Skin:     Comments: Skin of the forehead and upper back with mild comedonal and minimally inflammatory acne.   Neurological:      Mental Status: He is alert.         Procedures    Assessment / Plan      Assessment/Plan:   Diagnoses and all orders for this visit:    1. Acne vulgaris (Primary)  Assessment & Plan:  Use benzyl peroxide wash, plain Ivory bar soap to clean skin, Differin gel.  Use an oil free moisturizing sunscreen.  Refer to dermatology.    Orders:  -     adapalene (Differin) 0.1 % cream; Apply 1 Application topically to the appropriate area as directed Every Night. Face and upper back  Dispense: 45 g; Refill: 12  -     benzoyl peroxide 5 % external liquid; Apply  topically to the appropriate area as directed 2 (Two) Times a Day. Wash upper back and face once or twice daily.  Dispense: 236 mL; Refill: 12  -     Ambulatory Referral to Dermatology    2. Uncircumcised male  Assessment & Plan:  Discussed skin care, not over cleaning or aggressively cleaning but use of plain soap such as Ivory soap.  I recommend stay away from antibacterial soaps because of history of yeast infection.  Dry the tip of the penis when urinating before allowing the foreskin to contract.  Patient was also examined by Dr. Collado.         Medications:     Current Outpatient Medications:   •  adapalene (Differin) 0.1 % cream, Apply 1 Application topically to the appropriate area as directed Every Night. Face and upper back, Disp: 45 g, Rfl: 12  •  benzoyl peroxide 5 % external liquid, Apply  topically to the appropriate area as directed 2 (Two) Times a Day. Wash " upper back and face once or twice daily., Disp: 236 mL, Rfl: 12    I spent 30 minutes caring for Mick on this date of service. This time includes time spent by me in the following activities:obtaining and/or reviewing a separately obtained history, performing a medically appropriate examination and/or evaluation , counseling and educating the patient/family/caregiver, ordering medications, tests, or procedures, referring and communicating with other health care professionals , and documenting information in the medical record    Follow Up:   No follow-ups on file.    Eva Dean PA-C   [facility]     NOTE TO PATIENT: The 21st Century Cures Act makes medical notes like these available to patients in the interest of transparency. However, be advised this is a medical document. It is intended as peer to peer communication. It is written in medical language and may contain abbreviations or verbiage that are unfamiliar. It may appear blunt or direct. Medical documents are intended to carry relevant information, facts as evident, and the clinical opinion of the practitioner.

## 2025-06-23 NOTE — ASSESSMENT & PLAN NOTE
Discussed skin care, not over cleaning or aggressively cleaning but use of plain soap such as Ivory soap.  I recommend stay away from antibacterial soaps because of history of yeast infection.  Dry the tip of the penis when urinating before allowing the foreskin to contract.  Patient was also examined by Dr. Collado.

## 2025-06-24 ENCOUNTER — PRIOR AUTHORIZATION (OUTPATIENT)
Dept: FAMILY MEDICINE CLINIC | Facility: CLINIC | Age: 30
End: 2025-06-24
Payer: COMMERCIAL

## 2025-06-24 ENCOUNTER — PATIENT MESSAGE (OUTPATIENT)
Dept: FAMILY MEDICINE CLINIC | Facility: CLINIC | Age: 30
End: 2025-06-24
Payer: COMMERCIAL

## 2025-06-24 NOTE — PROGRESS NOTES
I have reviewed the notes, assessments, and/or procedures performed by Phillip PIRES, I concur with her/his documentation of Mick Roa Jr..

## 2025-07-07 ENCOUNTER — TELEPHONE (OUTPATIENT)
Dept: FAMILY MEDICINE CLINIC | Facility: CLINIC | Age: 30
End: 2025-07-07
Payer: COMMERCIAL

## 2025-07-07 DIAGNOSIS — Z78.9 UNCIRCUMCISED MALE: Primary | ICD-10-CM

## 2025-07-07 NOTE — TELEPHONE ENCOUNTER
Pt states he has new symptoms - more reddening and tightening of foreskin. Denies d/c from penis.  Denies fevers, chills, or other constitutional symptoms.  Reports remote hx of strep infection of foreskin. Referral placed to urology per pt request. Reports vigilant hygiene. Pt does not have preference about urology referral.

## 2025-07-07 NOTE — TELEPHONE ENCOUNTER
Caller: Mick Roa Jr.    Relationship: Self    Best call back number:       [phone] (Mobile)     What is the medical concern/diagnosis:     ITCHING    REDNESS/TIGHTENING OF FORESKIN IN GENITAL AREA (NEW SYMPTOMS SINCE 6/23 APPOINTMENT)    PATIENT STATED HE THOUGHT A REFERRAL FOR UROLOGY WAS TO BE SET FOLLOWING APPOINTMENT WITH MONICA GIRON ON 6/23    PATIENT ALSO STATED HE HAS NOT HEARD ANYTHING AS OF TODAY, 7/7    What specialty or service is being requested:     UROLOGIST    What is the provider, practice or medical service name:     AS RECOMMENDED BY MONICA GIRON OR DR MIKE, WHO GAVE A SECOND OPINION     What is the office location:     [place] AREA PREFERRED    Any additional details:     PLEASE CONTACT PATIENT WITH ANY UPDATES FOR THIS REQUEST